# Patient Record
Sex: FEMALE | Race: BLACK OR AFRICAN AMERICAN | NOT HISPANIC OR LATINO | Employment: STUDENT | URBAN - METROPOLITAN AREA
[De-identification: names, ages, dates, MRNs, and addresses within clinical notes are randomized per-mention and may not be internally consistent; named-entity substitution may affect disease eponyms.]

---

## 2019-01-08 LAB — PAP SMEAR: NORMAL

## 2021-02-01 ENCOUNTER — OFFICE VISIT (OUTPATIENT)
Dept: FAMILY MEDICINE | Facility: CLINIC | Age: 26
End: 2021-02-01
Payer: COMMERCIAL

## 2021-02-01 VITALS
RESPIRATION RATE: 18 BRPM | SYSTOLIC BLOOD PRESSURE: 154 MMHG | OXYGEN SATURATION: 96 % | BODY MASS INDEX: 32.38 KG/M2 | DIASTOLIC BLOOD PRESSURE: 90 MMHG | HEIGHT: 61 IN | HEART RATE: 95 BPM | TEMPERATURE: 97 F | WEIGHT: 171.5 LBS

## 2021-02-01 DIAGNOSIS — K59.00 CONSTIPATION, UNSPECIFIED CONSTIPATION TYPE: Primary | ICD-10-CM

## 2021-02-01 DIAGNOSIS — Z13.220 ENCOUNTER FOR LIPID SCREENING FOR CARDIOVASCULAR DISEASE: ICD-10-CM

## 2021-02-01 DIAGNOSIS — R03.0 ELEVATED BLOOD PRESSURE READING: ICD-10-CM

## 2021-02-01 DIAGNOSIS — Z11.59 NEED FOR HEPATITIS C SCREENING TEST: ICD-10-CM

## 2021-02-01 DIAGNOSIS — Z11.4 ENCOUNTER FOR SCREENING FOR HIV: ICD-10-CM

## 2021-02-01 DIAGNOSIS — Z13.6 ENCOUNTER FOR LIPID SCREENING FOR CARDIOVASCULAR DISEASE: ICD-10-CM

## 2021-02-01 PROCEDURE — 99999 PR PBB SHADOW E&M-NEW PATIENT-LVL IV: CPT | Mod: PBBFAC,,, | Performed by: FAMILY MEDICINE

## 2021-02-01 PROCEDURE — 99204 PR OFFICE/OUTPT VISIT, NEW, LEVL IV, 45-59 MIN: ICD-10-PCS | Mod: S$GLB,,, | Performed by: FAMILY MEDICINE

## 2021-02-01 PROCEDURE — 99999 PR PBB SHADOW E&M-NEW PATIENT-LVL IV: ICD-10-PCS | Mod: PBBFAC,,, | Performed by: FAMILY MEDICINE

## 2021-02-01 PROCEDURE — 99204 OFFICE O/P NEW MOD 45 MIN: CPT | Mod: S$GLB,,, | Performed by: FAMILY MEDICINE

## 2021-02-15 ENCOUNTER — PATIENT OUTREACH (OUTPATIENT)
Dept: ADMINISTRATIVE | Facility: HOSPITAL | Age: 26
End: 2021-02-15

## 2021-03-01 ENCOUNTER — OFFICE VISIT (OUTPATIENT)
Dept: FAMILY MEDICINE | Facility: CLINIC | Age: 26
End: 2021-03-01
Payer: COMMERCIAL

## 2021-03-01 VITALS
RESPIRATION RATE: 18 BRPM | TEMPERATURE: 98 F | HEIGHT: 61 IN | BODY MASS INDEX: 34.21 KG/M2 | SYSTOLIC BLOOD PRESSURE: 142 MMHG | HEART RATE: 90 BPM | WEIGHT: 181.19 LBS | DIASTOLIC BLOOD PRESSURE: 93 MMHG | OXYGEN SATURATION: 99 %

## 2021-03-01 DIAGNOSIS — K59.00 CONSTIPATION, UNSPECIFIED CONSTIPATION TYPE: ICD-10-CM

## 2021-03-01 DIAGNOSIS — E66.09 CLASS 1 OBESITY DUE TO EXCESS CALORIES WITH SERIOUS COMORBIDITY AND BODY MASS INDEX (BMI) OF 34.0 TO 34.9 IN ADULT: ICD-10-CM

## 2021-03-01 DIAGNOSIS — I10 HYPERTENSION, BENIGN: Primary | ICD-10-CM

## 2021-03-01 PROCEDURE — 99999 PR PBB SHADOW E&M-EST. PATIENT-LVL IV: CPT | Mod: PBBFAC,,, | Performed by: FAMILY MEDICINE

## 2021-03-01 PROCEDURE — 99214 PR OFFICE/OUTPT VISIT, EST, LEVL IV, 30-39 MIN: ICD-10-PCS | Mod: S$GLB,,, | Performed by: FAMILY MEDICINE

## 2021-03-01 PROCEDURE — 99214 OFFICE O/P EST MOD 30 MIN: CPT | Mod: S$GLB,,, | Performed by: FAMILY MEDICINE

## 2021-03-01 PROCEDURE — 99999 PR PBB SHADOW E&M-EST. PATIENT-LVL IV: ICD-10-PCS | Mod: PBBFAC,,, | Performed by: FAMILY MEDICINE

## 2021-03-01 RX ORDER — NORELGESTROMIN AND ETHINYL ESTRADIOL 150; 35 UG/D; UG/D
PATCH TRANSDERMAL
COMMUNITY
Start: 2021-02-25 | End: 2023-04-27

## 2021-03-01 RX ORDER — COVID-19 MOLECULAR TEST ASSAY
KIT MISCELLANEOUS
COMMUNITY
Start: 2021-02-26 | End: 2022-04-11

## 2021-03-01 RX ORDER — AMLODIPINE BESYLATE 5 MG/1
5 TABLET ORAL DAILY
Qty: 90 TABLET | Refills: 3 | Status: SHIPPED | OUTPATIENT
Start: 2021-03-01 | End: 2021-09-23

## 2021-03-03 DIAGNOSIS — K59.00 CONSTIPATION, UNSPECIFIED CONSTIPATION TYPE: ICD-10-CM

## 2021-03-09 ENCOUNTER — PATIENT MESSAGE (OUTPATIENT)
Dept: FAMILY MEDICINE | Facility: CLINIC | Age: 26
End: 2021-03-09

## 2021-03-15 ENCOUNTER — CLINICAL SUPPORT (OUTPATIENT)
Dept: FAMILY MEDICINE | Facility: CLINIC | Age: 26
End: 2021-03-15
Payer: COMMERCIAL

## 2021-03-15 VITALS
RESPIRATION RATE: 18 BRPM | HEIGHT: 61 IN | SYSTOLIC BLOOD PRESSURE: 132 MMHG | DIASTOLIC BLOOD PRESSURE: 86 MMHG | BODY MASS INDEX: 34.24 KG/M2 | TEMPERATURE: 98 F | HEART RATE: 94 BPM

## 2021-03-15 DIAGNOSIS — I10 HYPERTENSION, BENIGN: Primary | ICD-10-CM

## 2021-03-15 PROCEDURE — 99999 PR PBB SHADOW E&M-EST. PATIENT-LVL III: ICD-10-PCS | Mod: PBBFAC,,,

## 2021-03-15 PROCEDURE — 99499 NO LOS: ICD-10-PCS | Mod: S$GLB,,, | Performed by: FAMILY MEDICINE

## 2021-03-15 PROCEDURE — 99999 PR PBB SHADOW E&M-EST. PATIENT-LVL III: CPT | Mod: PBBFAC,,,

## 2021-03-15 PROCEDURE — 99499 UNLISTED E&M SERVICE: CPT | Mod: S$GLB,,, | Performed by: FAMILY MEDICINE

## 2021-04-15 ENCOUNTER — PATIENT MESSAGE (OUTPATIENT)
Dept: RESEARCH | Facility: HOSPITAL | Age: 26
End: 2021-04-15

## 2021-09-21 ENCOUNTER — LAB VISIT (OUTPATIENT)
Dept: LAB | Facility: HOSPITAL | Age: 26
End: 2021-09-21
Attending: FAMILY MEDICINE
Payer: COMMERCIAL

## 2021-09-21 DIAGNOSIS — I10 HYPERTENSION, BENIGN: ICD-10-CM

## 2021-09-21 PROCEDURE — 82570 ASSAY OF URINE CREATININE: CPT | Performed by: FAMILY MEDICINE

## 2021-09-22 LAB
ALBUMIN/CREAT UR: 11.8 UG/MG (ref 0–30)
CREAT UR-MCNC: 187 MG/DL (ref 15–325)
MICROALBUMIN UR DL<=1MG/L-MCNC: 22 UG/ML

## 2021-09-23 ENCOUNTER — OFFICE VISIT (OUTPATIENT)
Dept: FAMILY MEDICINE | Facility: CLINIC | Age: 26
End: 2021-09-23
Payer: COMMERCIAL

## 2021-09-23 VITALS
SYSTOLIC BLOOD PRESSURE: 156 MMHG | TEMPERATURE: 98 F | BODY MASS INDEX: 32.13 KG/M2 | OXYGEN SATURATION: 99 % | RESPIRATION RATE: 18 BRPM | HEART RATE: 99 BPM | HEIGHT: 61 IN | DIASTOLIC BLOOD PRESSURE: 80 MMHG | WEIGHT: 170.19 LBS

## 2021-09-23 DIAGNOSIS — E66.09 CLASS 1 OBESITY DUE TO EXCESS CALORIES WITH SERIOUS COMORBIDITY AND BODY MASS INDEX (BMI) OF 32.0 TO 32.9 IN ADULT: ICD-10-CM

## 2021-09-23 DIAGNOSIS — I10 HYPERTENSION, BENIGN: Primary | ICD-10-CM

## 2021-09-23 PROCEDURE — 99999 PR PBB SHADOW E&M-EST. PATIENT-LVL III: CPT | Mod: PBBFAC,,, | Performed by: FAMILY MEDICINE

## 2021-09-23 PROCEDURE — 99214 PR OFFICE/OUTPT VISIT, EST, LEVL IV, 30-39 MIN: ICD-10-PCS | Mod: S$GLB,,, | Performed by: FAMILY MEDICINE

## 2021-09-23 PROCEDURE — 99999 PR PBB SHADOW E&M-EST. PATIENT-LVL III: ICD-10-PCS | Mod: PBBFAC,,, | Performed by: FAMILY MEDICINE

## 2021-09-23 PROCEDURE — 99214 OFFICE O/P EST MOD 30 MIN: CPT | Mod: S$GLB,,, | Performed by: FAMILY MEDICINE

## 2021-09-23 RX ORDER — AMLODIPINE BESYLATE 10 MG/1
10 TABLET ORAL DAILY
Qty: 90 TABLET | Refills: 1 | Status: SHIPPED | OUTPATIENT
Start: 2021-09-23 | End: 2022-03-22

## 2021-09-27 ENCOUNTER — TELEPHONE (OUTPATIENT)
Dept: FAMILY MEDICINE | Facility: CLINIC | Age: 26
End: 2021-09-27

## 2021-10-07 ENCOUNTER — CLINICAL SUPPORT (OUTPATIENT)
Dept: FAMILY MEDICINE | Facility: CLINIC | Age: 26
End: 2021-10-07
Payer: COMMERCIAL

## 2021-10-07 VITALS
OXYGEN SATURATION: 99 % | RESPIRATION RATE: 18 BRPM | DIASTOLIC BLOOD PRESSURE: 84 MMHG | HEART RATE: 88 BPM | SYSTOLIC BLOOD PRESSURE: 130 MMHG

## 2021-10-07 DIAGNOSIS — R03.0 ELEVATED BLOOD PRESSURE READING: Primary | ICD-10-CM

## 2021-10-07 PROCEDURE — 99999 PR PBB SHADOW E&M-EST. PATIENT-LVL II: CPT | Mod: PBBFAC,,,

## 2021-10-07 PROCEDURE — 99499 UNLISTED E&M SERVICE: CPT | Mod: S$GLB,,, | Performed by: FAMILY MEDICINE

## 2021-10-07 PROCEDURE — 99499 NO LOS: ICD-10-PCS | Mod: S$GLB,,, | Performed by: FAMILY MEDICINE

## 2021-10-07 PROCEDURE — 99999 PR PBB SHADOW E&M-EST. PATIENT-LVL II: ICD-10-PCS | Mod: PBBFAC,,,

## 2021-10-09 DIAGNOSIS — I10 HYPERTENSION, BENIGN: Primary | ICD-10-CM

## 2021-10-09 RX ORDER — ACETAMINOPHEN 500 MG
TABLET ORAL
Qty: 1 EACH | Refills: 0 | Status: SHIPPED | OUTPATIENT
Start: 2021-10-09 | End: 2022-04-11 | Stop reason: SDUPTHER

## 2022-03-24 LAB — PAP RECOMMENDATION EXT: NORMAL

## 2022-04-04 ENCOUNTER — LAB VISIT (OUTPATIENT)
Dept: LAB | Facility: HOSPITAL | Age: 27
End: 2022-04-04
Attending: FAMILY MEDICINE
Payer: COMMERCIAL

## 2022-04-04 DIAGNOSIS — I10 HYPERTENSION, BENIGN: ICD-10-CM

## 2022-04-04 LAB
ALBUMIN SERPL BCP-MCNC: 3.9 G/DL (ref 3.5–5.2)
ALP SERPL-CCNC: 99 U/L (ref 55–135)
ALT SERPL W/O P-5'-P-CCNC: 16 U/L (ref 10–44)
ANION GAP SERPL CALC-SCNC: 4 MMOL/L (ref 8–16)
AST SERPL-CCNC: 24 U/L (ref 10–40)
BILIRUB SERPL-MCNC: 0.2 MG/DL (ref 0.1–1)
BUN SERPL-MCNC: 7 MG/DL (ref 6–20)
CALCIUM SERPL-MCNC: 9.8 MG/DL (ref 8.7–10.5)
CHLORIDE SERPL-SCNC: 106 MMOL/L (ref 95–110)
CHOLEST SERPL-MCNC: 272 MG/DL (ref 120–199)
CHOLEST/HDLC SERPL: 3.2 {RATIO} (ref 2–5)
CO2 SERPL-SCNC: 27 MMOL/L (ref 23–29)
CREAT SERPL-MCNC: 0.9 MG/DL (ref 0.5–1.4)
EST. GFR  (AFRICAN AMERICAN): >60 ML/MIN/1.73 M^2
EST. GFR  (NON AFRICAN AMERICAN): >60 ML/MIN/1.73 M^2
GLUCOSE SERPL-MCNC: 90 MG/DL (ref 70–110)
HDLC SERPL-MCNC: 85 MG/DL (ref 40–75)
HDLC SERPL: 31.3 % (ref 20–50)
LDLC SERPL CALC-MCNC: 168.8 MG/DL (ref 63–159)
NONHDLC SERPL-MCNC: 187 MG/DL
POTASSIUM SERPL-SCNC: 4.3 MMOL/L (ref 3.5–5.1)
PROT SERPL-MCNC: 7.7 G/DL (ref 6–8.4)
SODIUM SERPL-SCNC: 137 MMOL/L (ref 136–145)
TRIGL SERPL-MCNC: 91 MG/DL (ref 30–150)

## 2022-04-04 PROCEDURE — 36415 COLL VENOUS BLD VENIPUNCTURE: CPT | Mod: PN | Performed by: FAMILY MEDICINE

## 2022-04-04 PROCEDURE — 80053 COMPREHEN METABOLIC PANEL: CPT | Performed by: FAMILY MEDICINE

## 2022-04-04 PROCEDURE — 80061 LIPID PANEL: CPT | Performed by: FAMILY MEDICINE

## 2022-04-11 ENCOUNTER — OFFICE VISIT (OUTPATIENT)
Dept: FAMILY MEDICINE | Facility: CLINIC | Age: 27
End: 2022-04-11
Payer: COMMERCIAL

## 2022-04-11 VITALS
WEIGHT: 164 LBS | TEMPERATURE: 98 F | BODY MASS INDEX: 30.96 KG/M2 | HEART RATE: 91 BPM | OXYGEN SATURATION: 99 % | SYSTOLIC BLOOD PRESSURE: 128 MMHG | RESPIRATION RATE: 17 BRPM | HEIGHT: 61 IN | DIASTOLIC BLOOD PRESSURE: 70 MMHG

## 2022-04-11 DIAGNOSIS — E78.00 PURE HYPERCHOLESTEROLEMIA: ICD-10-CM

## 2022-04-11 DIAGNOSIS — I10 HYPERTENSION, BENIGN: Primary | ICD-10-CM

## 2022-04-11 DIAGNOSIS — G44.209 TENSION HEADACHE: ICD-10-CM

## 2022-04-11 PROCEDURE — 99214 PR OFFICE/OUTPT VISIT, EST, LEVL IV, 30-39 MIN: ICD-10-PCS | Mod: S$GLB,,, | Performed by: FAMILY MEDICINE

## 2022-04-11 PROCEDURE — 99999 PR PBB SHADOW E&M-EST. PATIENT-LVL IV: CPT | Mod: PBBFAC,,, | Performed by: FAMILY MEDICINE

## 2022-04-11 PROCEDURE — 99999 PR PBB SHADOW E&M-EST. PATIENT-LVL IV: ICD-10-PCS | Mod: PBBFAC,,, | Performed by: FAMILY MEDICINE

## 2022-04-11 PROCEDURE — 99214 OFFICE O/P EST MOD 30 MIN: CPT | Mod: S$GLB,,, | Performed by: FAMILY MEDICINE

## 2022-04-11 RX ORDER — BUTALBITAL, ACETAMINOPHEN AND CAFFEINE 50; 325; 40 MG/1; MG/1; MG/1
2 TABLET ORAL EVERY 8 HOURS PRN
Qty: 30 TABLET | Refills: 1 | Status: SHIPPED | OUTPATIENT
Start: 2022-04-11 | End: 2023-04-27 | Stop reason: ALTCHOICE

## 2022-04-11 RX ORDER — AMLODIPINE BESYLATE 10 MG/1
10 TABLET ORAL DAILY
Qty: 90 TABLET | Refills: 1 | Status: SHIPPED | OUTPATIENT
Start: 2022-04-11 | End: 2022-05-24

## 2022-04-11 NOTE — PROGRESS NOTES
Subjective:       Patient ID: Bailey Arita is a 26 y.o. female.    Chief Complaint: Results and Headache    Headache   This is a new problem. Episode onset: a few months. Episode frequency: 3-4 per month. The pain is located in the frontal and parietal region. Radiates to: towards neck. Quality: pressure. The pain is at a severity of 7/10. Pertinent negatives include no abdominal pain, ear pain, eye pain, eye watering, phonophobia, photophobia, scalp tenderness, seizures, vomiting or weight loss. Exacerbated by: random. Treatments tried: Excedrin.     Patient reports she is taking her amlodipine for hypertension as prescribed. No medication side effects reported      Review of Systems   Constitutional: Negative for weight loss.   HENT: Negative for ear pain.    Eyes: Negative for photophobia and pain.   Gastrointestinal: Negative for abdominal pain and vomiting.   Neurological: Positive for headaches. Negative for seizures.         Objective:      Physical Exam  Vitals reviewed.   Constitutional:       Appearance: She is well-developed.   HENT:      Head: Normocephalic and atraumatic.      Right Ear: External ear normal.      Left Ear: External ear normal.      Nose: Nose normal.      Mouth/Throat:      Pharynx: No oropharyngeal exudate.   Eyes:      General:         Right eye: No discharge.         Left eye: No discharge.      Conjunctiva/sclera: Conjunctivae normal.      Pupils: Pupils are equal, round, and reactive to light.   Neck:      Trachea: No tracheal deviation.   Cardiovascular:      Rate and Rhythm: Normal rate and regular rhythm.      Heart sounds: Normal heart sounds. No murmur heard.  Pulmonary:      Effort: Pulmonary effort is normal.      Breath sounds: Normal breath sounds. No wheezing or rales.   Abdominal:      General: Bowel sounds are normal.      Palpations: Abdomen is soft. Abdomen is not rigid. There is no mass.      Tenderness: There is no abdominal tenderness. There is no guarding.    Musculoskeletal:      Cervical back: Normal range of motion and neck supple.   Lymphadenopathy:      Cervical: No cervical adenopathy.   Neurological:      Mental Status: She is oriented to person, place, and time.      Sensory: No sensory deficit.      Motor: No atrophy.      Gait: Gait normal.      Deep Tendon Reflexes:      Reflex Scores:       Patellar reflexes are 2+ on the right side and 2+ on the left side.        Assessment:       Problem List Items Addressed This Visit    None     Visit Diagnoses     Hypertension, benign    -  Primary    Relevant Medications    amLODIPine (NORVASC) 10 MG tablet    Other Relevant Orders    Comprehensive Metabolic Panel    Lipid Panel    Microalbumin/Creatinine Ratio, Urine    CBC Auto Differential    Pure hypercholesterolemia        Relevant Orders    Comprehensive Metabolic Panel    Lipid Panel    CBC Auto Differential          Plan:       Bailey was seen today for results and headache.    Diagnoses and all orders for this visit:    Hypertension, benign  -     amLODIPine (NORVASC) 10 MG tablet; Take 1 tablet (10 mg total) by mouth once daily.  -     Comprehensive Metabolic Panel; Future  -     Lipid Panel; Future  -     Microalbumin/Creatinine Ratio, Urine; Future  -     CBC Auto Differential; Future  Current therapy effective, will continue.      Pure hypercholesterolemia  -     Comprehensive Metabolic Panel; Future  -     Lipid Panel; Future  -     CBC Auto Differential; Future  Check lipid studies.    Tension headache  -     butalbital-acetaminophen-caffeine -40 mg (FIORICET, ESGIC) -40 mg per tablet; Take 2 tablets by mouth every 8 (eight) hours as needed for Headaches.  Short course of Fioricet prescribed.  Advised patient if headaches become more frequent will need neurology consult.

## 2022-05-24 DIAGNOSIS — I10 HYPERTENSION, BENIGN: ICD-10-CM

## 2022-05-24 RX ORDER — AMLODIPINE BESYLATE 10 MG/1
TABLET ORAL
Qty: 90 TABLET | Refills: 3 | Status: SHIPPED | OUTPATIENT
Start: 2022-05-24 | End: 2022-07-09

## 2022-05-24 NOTE — TELEPHONE ENCOUNTER
No new care gaps identified.  [facility] Embedded Care Gaps. Reference number: [id number]. 5/24/2022   8:07:09 AM CDT

## 2022-05-25 NOTE — TELEPHONE ENCOUNTER
Refill Authorization Note   Bailey Arita  is requesting a refill authorization.  Brief Assessment and Rationale for Refill:  Approve     Medication Therapy Plan:       Medication Reconciliation Completed: No   Comments:     No Care Gaps recommended.     Note composed:7:10 PM 05/24/2022

## 2022-07-09 RX ORDER — AMLODIPINE BESYLATE 10 MG/1
10 TABLET ORAL DAILY
COMMUNITY
End: 2022-10-13 | Stop reason: SDUPTHER

## 2022-07-10 NOTE — TELEPHONE ENCOUNTER
Amlodipine order discontinued due to cosign declined by Dr. Conteh. Entered patient-reported order on med list for placeholder.

## 2022-10-06 ENCOUNTER — LAB VISIT (OUTPATIENT)
Dept: LAB | Facility: HOSPITAL | Age: 27
End: 2022-10-06
Attending: FAMILY MEDICINE
Payer: MEDICAID

## 2022-10-06 DIAGNOSIS — I10 HYPERTENSION, BENIGN: ICD-10-CM

## 2022-10-06 DIAGNOSIS — E78.00 PURE HYPERCHOLESTEROLEMIA: ICD-10-CM

## 2022-10-06 LAB
ALBUMIN SERPL BCP-MCNC: 3.8 G/DL (ref 3.5–5.2)
ALP SERPL-CCNC: 82 U/L (ref 55–135)
ALT SERPL W/O P-5'-P-CCNC: 11 U/L (ref 10–44)
ANION GAP SERPL CALC-SCNC: 8 MMOL/L (ref 8–16)
AST SERPL-CCNC: 25 U/L (ref 10–40)
BASOPHILS # BLD AUTO: 0.03 K/UL (ref 0–0.2)
BASOPHILS NFR BLD: 0.5 % (ref 0–1.9)
BILIRUB SERPL-MCNC: 0.2 MG/DL (ref 0.1–1)
BUN SERPL-MCNC: 8 MG/DL (ref 6–20)
CALCIUM SERPL-MCNC: 9.6 MG/DL (ref 8.7–10.5)
CHLORIDE SERPL-SCNC: 104 MMOL/L (ref 95–110)
CHOLEST SERPL-MCNC: 276 MG/DL (ref 120–199)
CHOLEST/HDLC SERPL: 3.1 {RATIO} (ref 2–5)
CO2 SERPL-SCNC: 28 MMOL/L (ref 23–29)
CREAT SERPL-MCNC: 0.9 MG/DL (ref 0.5–1.4)
DIFFERENTIAL METHOD: ABNORMAL
EOSINOPHIL # BLD AUTO: 0.2 K/UL (ref 0–0.5)
EOSINOPHIL NFR BLD: 4.3 % (ref 0–8)
ERYTHROCYTE [DISTWIDTH] IN BLOOD BY AUTOMATED COUNT: 14.6 % (ref 11.5–14.5)
EST. GFR  (NO RACE VARIABLE): >60 ML/MIN/1.73 M^2
GLUCOSE SERPL-MCNC: 97 MG/DL (ref 70–110)
HCT VFR BLD AUTO: 34.1 % (ref 37–48.5)
HDLC SERPL-MCNC: 89 MG/DL (ref 40–75)
HDLC SERPL: 32.2 % (ref 20–50)
HGB BLD-MCNC: 10.6 G/DL (ref 12–16)
IMM GRANULOCYTES # BLD AUTO: 0.02 K/UL (ref 0–0.04)
IMM GRANULOCYTES NFR BLD AUTO: 0.4 % (ref 0–0.5)
LDLC SERPL CALC-MCNC: 163.2 MG/DL (ref 63–159)
LYMPHOCYTES # BLD AUTO: 1.6 K/UL (ref 1–4.8)
LYMPHOCYTES NFR BLD: 29.2 % (ref 18–48)
MCH RBC QN AUTO: 25.4 PG (ref 27–31)
MCHC RBC AUTO-ENTMCNC: 31.1 G/DL (ref 32–36)
MCV RBC AUTO: 82 FL (ref 82–98)
MONOCYTES # BLD AUTO: 0.6 K/UL (ref 0.3–1)
MONOCYTES NFR BLD: 10.6 % (ref 4–15)
NEUTROPHILS # BLD AUTO: 3.1 K/UL (ref 1.8–7.7)
NEUTROPHILS NFR BLD: 55 % (ref 38–73)
NONHDLC SERPL-MCNC: 187 MG/DL
NRBC BLD-RTO: 0 /100 WBC
PLATELET # BLD AUTO: 253 K/UL (ref 150–450)
PMV BLD AUTO: 10.4 FL (ref 9.2–12.9)
POTASSIUM SERPL-SCNC: 4.2 MMOL/L (ref 3.5–5.1)
PROT SERPL-MCNC: 7.5 G/DL (ref 6–8.4)
RBC # BLD AUTO: 4.17 M/UL (ref 4–5.4)
SODIUM SERPL-SCNC: 140 MMOL/L (ref 136–145)
TRIGL SERPL-MCNC: 119 MG/DL (ref 30–150)
WBC # BLD AUTO: 5.59 K/UL (ref 3.9–12.7)

## 2022-10-06 PROCEDURE — 80053 COMPREHEN METABOLIC PANEL: CPT | Performed by: FAMILY MEDICINE

## 2022-10-06 PROCEDURE — 36415 COLL VENOUS BLD VENIPUNCTURE: CPT | Mod: PN | Performed by: FAMILY MEDICINE

## 2022-10-06 PROCEDURE — 85025 COMPLETE CBC W/AUTO DIFF WBC: CPT | Performed by: FAMILY MEDICINE

## 2022-10-06 PROCEDURE — 80061 LIPID PANEL: CPT | Performed by: FAMILY MEDICINE

## 2022-10-13 ENCOUNTER — OFFICE VISIT (OUTPATIENT)
Dept: FAMILY MEDICINE | Facility: CLINIC | Age: 27
End: 2022-10-13
Payer: MEDICAID

## 2022-10-13 VITALS — DIASTOLIC BLOOD PRESSURE: 78 MMHG | SYSTOLIC BLOOD PRESSURE: 121 MMHG

## 2022-10-13 DIAGNOSIS — R51.9 CHRONIC NONINTRACTABLE HEADACHE, UNSPECIFIED HEADACHE TYPE: ICD-10-CM

## 2022-10-13 DIAGNOSIS — D64.9 NORMOCYTIC ANEMIA: ICD-10-CM

## 2022-10-13 DIAGNOSIS — I10 HYPERTENSION, BENIGN: Primary | ICD-10-CM

## 2022-10-13 DIAGNOSIS — G89.29 CHRONIC NONINTRACTABLE HEADACHE, UNSPECIFIED HEADACHE TYPE: ICD-10-CM

## 2022-10-13 DIAGNOSIS — E78.00 PURE HYPERCHOLESTEROLEMIA: ICD-10-CM

## 2022-10-13 PROCEDURE — 3066F NEPHROPATHY DOC TX: CPT | Mod: CPTII,95,, | Performed by: FAMILY MEDICINE

## 2022-10-13 PROCEDURE — 1159F PR MEDICATION LIST DOCUMENTED IN MEDICAL RECORD: ICD-10-PCS | Mod: CPTII,95,, | Performed by: FAMILY MEDICINE

## 2022-10-13 PROCEDURE — 1160F PR REVIEW ALL MEDS BY PRESCRIBER/CLIN PHARMACIST DOCUMENTED: ICD-10-PCS | Mod: CPTII,95,, | Performed by: FAMILY MEDICINE

## 2022-10-13 PROCEDURE — 3078F PR MOST RECENT DIASTOLIC BLOOD PRESSURE < 80 MM HG: ICD-10-PCS | Mod: CPTII,95,, | Performed by: FAMILY MEDICINE

## 2022-10-13 PROCEDURE — 3061F NEG MICROALBUMINURIA REV: CPT | Mod: CPTII,95,, | Performed by: FAMILY MEDICINE

## 2022-10-13 PROCEDURE — 1160F RVW MEDS BY RX/DR IN RCRD: CPT | Mod: CPTII,95,, | Performed by: FAMILY MEDICINE

## 2022-10-13 PROCEDURE — 99214 OFFICE O/P EST MOD 30 MIN: CPT | Mod: 95,,, | Performed by: FAMILY MEDICINE

## 2022-10-13 PROCEDURE — 3061F PR NEG MICROALBUMINURIA RESULT DOCUMENTED/REVIEW: ICD-10-PCS | Mod: CPTII,95,, | Performed by: FAMILY MEDICINE

## 2022-10-13 PROCEDURE — 3074F PR MOST RECENT SYSTOLIC BLOOD PRESSURE < 130 MM HG: ICD-10-PCS | Mod: CPTII,95,, | Performed by: FAMILY MEDICINE

## 2022-10-13 PROCEDURE — 3074F SYST BP LT 130 MM HG: CPT | Mod: CPTII,95,, | Performed by: FAMILY MEDICINE

## 2022-10-13 PROCEDURE — 1159F MED LIST DOCD IN RCRD: CPT | Mod: CPTII,95,, | Performed by: FAMILY MEDICINE

## 2022-10-13 PROCEDURE — 3066F PR DOCUMENTATION OF TREATMENT FOR NEPHROPATHY: ICD-10-PCS | Mod: CPTII,95,, | Performed by: FAMILY MEDICINE

## 2022-10-13 PROCEDURE — 99214 PR OFFICE/OUTPT VISIT, EST, LEVL IV, 30-39 MIN: ICD-10-PCS | Mod: 95,,, | Performed by: FAMILY MEDICINE

## 2022-10-13 PROCEDURE — 3078F DIAST BP <80 MM HG: CPT | Mod: CPTII,95,, | Performed by: FAMILY MEDICINE

## 2022-10-13 RX ORDER — AMLODIPINE BESYLATE 10 MG/1
10 TABLET ORAL DAILY
Qty: 90 TABLET | Refills: 3 | Status: SHIPPED | OUTPATIENT
Start: 2022-10-13 | End: 2023-11-02 | Stop reason: SDUPTHER

## 2022-10-14 ENCOUNTER — PATIENT OUTREACH (OUTPATIENT)
Dept: ADMINISTRATIVE | Facility: HOSPITAL | Age: 27
End: 2022-10-14
Payer: MEDICAID

## 2022-10-17 NOTE — PROGRESS NOTES
The patient location is: [place], [place]  The chief complaint leading to consultation is: HTN and lab results    Visit type: audiovisual    Face to Face time with patient: 20 minutes  24 minutes of total time spent on the encounter, which includes face to face time and non-face to face time preparing to see the patient (eg, review of tests), Obtaining and/or reviewing separately obtained history, Documenting clinical information in the electronic or other health record, Independently interpreting results (not separately reported) and communicating results to the patient/family/caregiver, or Care coordination (not separately reported).         Each patient to whom he or she provides medical services by telemedicine is:  (1) informed of the relationship between the physician and patient and the respective role of any other health care provider with respect to management of the patient; and (2) notified that he or she may decline to receive medical services by telemedicine and may withdraw from such care at any time.    Notes:   Subjective:       Patient ID: Bailey Arita is a 26 y.o. female.    Chief Complaint: Hypertension and Results    Hypertension  This is a chronic problem. The problem is controlled. Associated symptoms include headaches (chronic). Pertinent negatives include no chest pain, neck pain or palpitations. Past treatments include calcium channel blockers. The current treatment provides significant improvement. There are no compliance problems.    Review of Systems   Constitutional:  Negative for activity change and unexpected weight change.   HENT:  Negative for hearing loss, rhinorrhea and trouble swallowing.    Eyes:  Negative for discharge and visual disturbance.   Respiratory:  Negative for chest tightness and wheezing.    Cardiovascular:  Negative for chest pain and palpitations.   Gastrointestinal:  Positive for constipation. Negative for blood in stool, diarrhea and vomiting.   Endocrine:  Negative for polydipsia and polyuria.   Genitourinary:  Positive for menstrual problem. Negative for difficulty urinating, dysuria and hematuria.   Musculoskeletal:  Negative for arthralgias, joint swelling and neck pain.   Neurological:  Positive for headaches (chronic). Negative for weakness.   Psychiatric/Behavioral:  Negative for confusion and dysphoric mood.        Objective:      Physical Exam  Pulmonary:      Effort: Pulmonary effort is normal.   Neurological:      Mental Status: She is alert.   Psychiatric:         Mood and Affect: Mood normal.         Behavior: Behavior normal.         Thought Content: Thought content normal.         Lab Visit on 10/06/2022   Component Date Value Ref Range Status    Microalbumin, Urine 10/06/2022 16.0  ug/mL Final    Creatinine, Urine 10/06/2022 93.0  15.0 - 325.0 mg/dL Final    Microalb/Creat Ratio 10/06/2022 17.2  0.0 - 30.0 ug/mg Final   Lab Visit on 10/06/2022   Component Date Value Ref Range Status    Sodium 10/06/2022 140  136 - 145 mmol/L Final    Potassium 10/06/2022 4.2  3.5 - 5.1 mmol/L Final    Chloride 10/06/2022 104  95 - 110 mmol/L Final    CO2 10/06/2022 28  23 - 29 mmol/L Final    Glucose 10/06/2022 97  70 - 110 mg/dL Final    BUN 10/06/2022 8  6 - 20 mg/dL Final    Creatinine 10/06/2022 0.9  0.5 - 1.4 mg/dL Final    Calcium 10/06/2022 9.6  8.7 - 10.5 mg/dL Final    Total Protein 10/06/2022 7.5  6.0 - 8.4 g/dL Final    Albumin 10/06/2022 3.8  3.5 - 5.2 g/dL Final    Total Bilirubin 10/06/2022 0.2  0.1 - 1.0 mg/dL Final    Comment: For infants and newborns, interpretation of results should be based  on gestational age, weight and in agreement with clinical  observations.    Premature Infant recommended reference ranges:  Up to 24 hours.............<8.0 mg/dL  Up to 48 hours............<12.0 mg/dL  3-5 days..................<15.0 mg/dL  6-29 days.................<15.0 mg/dL      Alkaline Phosphatase 10/06/2022 82  55 - 135 U/L Final    AST 10/06/2022 25  10 -  40 U/L Final    ALT 10/06/2022 11  10 - 44 U/L Final    Anion Gap 10/06/2022 8  8 - 16 mmol/L Final    eGFR 10/06/2022 >60  >60 mL/min/1.73 m^2 Final    Cholesterol 10/06/2022 276 (H)  120 - 199 mg/dL Final    Comment: The National Cholesterol Education Program (NCEP) has set the  following guidelines (reference ranges) for Cholesterol:  Optimal.....................<200 mg/dL  Borderline High.............200-239 mg/dL  High........................> or = 240 mg/dL      Triglycerides 10/06/2022 119  30 - 150 mg/dL Final    Comment: The National Cholesterol Education Program (NCEP) has set the  following guidelines (reference values) for triglycerides:  Normal......................<150 mg/dL  Borderline High.............150-199 mg/dL  High........................200-499 mg/dL      HDL 10/06/2022 89 (H)  40 - 75 mg/dL Final    Comment: The National Cholesterol Education Program (NCEP) has set the  following guidelines (reference values) for HDL Cholesterol:  Low...............<40 mg/dL  Optimal...........>60 mg/dL      LDL Cholesterol 10/06/2022 163.2 (H)  63.0 - 159.0 mg/dL Final    Comment: The National Cholesterol Education Program (NCEP) has set the  following guidelines (reference values) for LDL Cholesterol:  Optimal.......................<130 mg/dL  Borderline High...............130-159 mg/dL  High..........................160-189 mg/dL  Very High.....................>190 mg/dL      HDL/Cholesterol Ratio 10/06/2022 32.2  20.0 - 50.0 % Final    Total Cholesterol/HDL Ratio 10/06/2022 3.1  2.0 - 5.0 Final    Non-HDL Cholesterol 10/06/2022 187  mg/dL Final    Comment: Risk category and Non-HDL cholesterol goals:  Coronary heart disease (CHD)or equivalent (10-year risk of CHD >20%):  Non-HDL cholesterol goal     <130 mg/dL  Two or more CHD risk factors and 10-year risk of CHD <= 20%:  Non-HDL cholesterol goal     <160 mg/dL  0 to 1 CHD risk factor:  Non-HDL cholesterol goal     <190 mg/dL      WBC 10/06/2022 5.59   3.90 - 12.70 K/uL Final    RBC 10/06/2022 4.17  4.00 - 5.40 M/uL Final    Hemoglobin 10/06/2022 10.6 (L)  12.0 - 16.0 g/dL Final    Hematocrit 10/06/2022 34.1 (L)  37.0 - 48.5 % Final    MCV 10/06/2022 82  82 - 98 fL Final    MCH 10/06/2022 25.4 (L)  27.0 - 31.0 pg Final    MCHC 10/06/2022 31.1 (L)  32.0 - 36.0 g/dL Final    RDW 10/06/2022 14.6 (H)  11.5 - 14.5 % Final    Platelets 10/06/2022 253  150 - 450 K/uL Final    MPV 10/06/2022 10.4  9.2 - 12.9 fL Final    Immature Granulocytes 10/06/2022 0.4  0.0 - 0.5 % Final    Gran # (ANC) 10/06/2022 3.1  1.8 - 7.7 K/uL Final    Immature Grans (Abs) 10/06/2022 0.02  0.00 - 0.04 K/uL Final    Comment: Mild elevation in immature granulocytes is non specific and   can be seen in a variety of conditions including stress response,   acute inflammation, trauma and pregnancy. Correlation with other   laboratory and clinical findings is essential.      Lymph # 10/06/2022 1.6  1.0 - 4.8 K/uL Final    Mono # 10/06/2022 0.6  0.3 - 1.0 K/uL Final    Eos # 10/06/2022 0.2  0.0 - 0.5 K/uL Final    Baso # 10/06/2022 0.03  0.00 - 0.20 K/uL Final    nRBC 10/06/2022 0  0 /100 WBC Final    Gran % 10/06/2022 55.0  38.0 - 73.0 % Final    Lymph % 10/06/2022 29.2  18.0 - 48.0 % Final    Mono % 10/06/2022 10.6  4.0 - 15.0 % Final    Eosinophil % 10/06/2022 4.3  0.0 - 8.0 % Final    Basophil % 10/06/2022 0.5  0.0 - 1.9 % Final    Differential Method 10/06/2022 Automated   Final       Assessment:       Problem List Items Addressed This Visit    None  Visit Diagnoses       Hypertension, benign    -  Primary    Relevant Medications    amLODIPine (NORVASC) 10 MG tablet    Chronic nonintractable headache, unspecified headache type        Relevant Orders    Ambulatory referral/consult to Neurology    Normocytic anemia        Relevant Orders    CBC Auto Differential    Iron and TIBC    Ferritin    RETICULOCYTES    Pure hypercholesterolemia        Relevant Orders    Lipid Panel    Comprehensive Metabolic  Panel              Plan:      Bailey was seen today for hypertension and results.    Diagnoses and all orders for this visit:    Hypertension, benign  -     amLODIPine (NORVASC) 10 MG tablet; Take 1 tablet (10 mg total) by mouth once daily.  Current therapy effective, will continue    Chronic nonintractable headache, unspecified headache type  -     Ambulatory referral/consult to Neurology; Future  Will get neuro eval    Normocytic anemia  -     CBC Auto Differential; Future  -     Iron and TIBC; Future  -     Ferritin; Future  -     RETICULOCYTES; Future  Recheck in 6 weeks    Pure hypercholesterolemia  -     Lipid Panel; Future  -     Comprehensive Metabolic Panel; Future  Dietary and lifestyle changes discussed  Recheck in 6 months

## 2022-11-10 ENCOUNTER — LAB VISIT (OUTPATIENT)
Dept: LAB | Facility: HOSPITAL | Age: 27
End: 2022-11-10
Attending: FAMILY MEDICINE
Payer: MEDICAID

## 2022-11-10 DIAGNOSIS — D64.9 NORMOCYTIC ANEMIA: ICD-10-CM

## 2022-11-10 LAB
BASOPHILS # BLD AUTO: 0.03 K/UL (ref 0–0.2)
BASOPHILS NFR BLD: 0.5 % (ref 0–1.9)
DIFFERENTIAL METHOD: ABNORMAL
EOSINOPHIL # BLD AUTO: 0.2 K/UL (ref 0–0.5)
EOSINOPHIL NFR BLD: 3.5 % (ref 0–8)
ERYTHROCYTE [DISTWIDTH] IN BLOOD BY AUTOMATED COUNT: 14.7 % (ref 11.5–14.5)
FERRITIN SERPL-MCNC: 5 NG/ML (ref 20–300)
HCT VFR BLD AUTO: 33.9 % (ref 37–48.5)
HGB BLD-MCNC: 10.7 G/DL (ref 12–16)
IMM GRANULOCYTES # BLD AUTO: 0.01 K/UL (ref 0–0.04)
IMM GRANULOCYTES NFR BLD AUTO: 0.2 % (ref 0–0.5)
IRON SERPL-MCNC: 41 UG/DL (ref 30–160)
LYMPHOCYTES # BLD AUTO: 1.7 K/UL (ref 1–4.8)
LYMPHOCYTES NFR BLD: 28 % (ref 18–48)
MCH RBC QN AUTO: 26 PG (ref 27–31)
MCHC RBC AUTO-ENTMCNC: 31.6 G/DL (ref 32–36)
MCV RBC AUTO: 83 FL (ref 82–98)
MONOCYTES # BLD AUTO: 0.5 K/UL (ref 0.3–1)
MONOCYTES NFR BLD: 8.8 % (ref 4–15)
NEUTROPHILS # BLD AUTO: 3.5 K/UL (ref 1.8–7.7)
NEUTROPHILS NFR BLD: 59 % (ref 38–73)
NRBC BLD-RTO: 0 /100 WBC
PLATELET # BLD AUTO: 246 K/UL (ref 150–450)
PMV BLD AUTO: 10.7 FL (ref 9.2–12.9)
RBC # BLD AUTO: 4.11 M/UL (ref 4–5.4)
RETICS/RBC NFR AUTO: 1.3 % (ref 0.5–2.5)
SATURATED IRON: 7 % (ref 20–50)
TOTAL IRON BINDING CAPACITY: 548 UG/DL (ref 250–450)
TRANSFERRIN SERPL-MCNC: 370 MG/DL (ref 200–375)
WBC # BLD AUTO: 5.99 K/UL (ref 3.9–12.7)

## 2022-11-10 PROCEDURE — 36415 COLL VENOUS BLD VENIPUNCTURE: CPT | Mod: PN | Performed by: FAMILY MEDICINE

## 2022-11-10 PROCEDURE — 84466 ASSAY OF TRANSFERRIN: CPT | Performed by: FAMILY MEDICINE

## 2022-11-10 PROCEDURE — 82728 ASSAY OF FERRITIN: CPT | Performed by: FAMILY MEDICINE

## 2022-11-10 PROCEDURE — 85025 COMPLETE CBC W/AUTO DIFF WBC: CPT | Performed by: FAMILY MEDICINE

## 2022-11-10 PROCEDURE — 85045 AUTOMATED RETICULOCYTE COUNT: CPT | Performed by: FAMILY MEDICINE

## 2022-12-04 DIAGNOSIS — D64.9 NORMOCYTIC ANEMIA: Primary | ICD-10-CM

## 2022-12-04 DIAGNOSIS — D50.9 IRON DEFICIENCY ANEMIA, UNSPECIFIED IRON DEFICIENCY ANEMIA TYPE: ICD-10-CM

## 2022-12-06 ENCOUNTER — PATIENT MESSAGE (OUTPATIENT)
Dept: FAMILY MEDICINE | Facility: CLINIC | Age: 27
End: 2022-12-06
Payer: MEDICAID

## 2022-12-09 DIAGNOSIS — D50.9 IRON DEFICIENCY ANEMIA, UNSPECIFIED: Primary | ICD-10-CM

## 2023-02-13 ENCOUNTER — LAB VISIT (OUTPATIENT)
Dept: LAB | Facility: HOSPITAL | Age: 28
End: 2023-02-13
Attending: STUDENT IN AN ORGANIZED HEALTH CARE EDUCATION/TRAINING PROGRAM
Payer: MEDICAID

## 2023-02-13 DIAGNOSIS — D50.9 IRON DEFICIENCY ANEMIA, UNSPECIFIED: ICD-10-CM

## 2023-02-13 LAB
ALBUMIN SERPL BCP-MCNC: 4 G/DL (ref 3.5–5.2)
ALP SERPL-CCNC: 92 U/L (ref 55–135)
ALT SERPL W/O P-5'-P-CCNC: 9 U/L (ref 10–44)
ANION GAP SERPL CALC-SCNC: 8 MMOL/L (ref 8–16)
AST SERPL-CCNC: 28 U/L (ref 10–40)
BASOPHILS # BLD AUTO: 0.05 K/UL (ref 0–0.2)
BASOPHILS NFR BLD: 0.9 % (ref 0–1.9)
BILIRUB SERPL-MCNC: 0.3 MG/DL (ref 0.1–1)
BUN SERPL-MCNC: 8 MG/DL (ref 6–20)
CALCIUM SERPL-MCNC: 9.7 MG/DL (ref 8.7–10.5)
CHLORIDE SERPL-SCNC: 105 MMOL/L (ref 95–110)
CO2 SERPL-SCNC: 27 MMOL/L (ref 23–29)
CREAT SERPL-MCNC: 0.9 MG/DL (ref 0.5–1.4)
DIFFERENTIAL METHOD: ABNORMAL
EOSINOPHIL # BLD AUTO: 0.2 K/UL (ref 0–0.5)
EOSINOPHIL NFR BLD: 3 % (ref 0–8)
ERYTHROCYTE [DISTWIDTH] IN BLOOD BY AUTOMATED COUNT: 13.3 % (ref 11.5–14.5)
EST. GFR  (NO RACE VARIABLE): >60 ML/MIN/1.73 M^2
FERRITIN SERPL-MCNC: 5 NG/ML (ref 20–300)
GLUCOSE SERPL-MCNC: 87 MG/DL (ref 70–110)
HCT VFR BLD AUTO: 34.9 % (ref 37–48.5)
HGB BLD-MCNC: 10.7 G/DL (ref 12–16)
IMM GRANULOCYTES # BLD AUTO: 0 K/UL (ref 0–0.04)
IMM GRANULOCYTES NFR BLD AUTO: 0 % (ref 0–0.5)
IRON SERPL-MCNC: 40 UG/DL (ref 30–160)
IRON SERPL-MCNC: 40 UG/DL (ref 30–160)
LYMPHOCYTES # BLD AUTO: 1.7 K/UL (ref 1–4.8)
LYMPHOCYTES NFR BLD: 32 % (ref 18–48)
MCH RBC QN AUTO: 25.3 PG (ref 27–31)
MCHC RBC AUTO-ENTMCNC: 30.7 G/DL (ref 32–36)
MCV RBC AUTO: 83 FL (ref 82–98)
MONOCYTES # BLD AUTO: 0.6 K/UL (ref 0.3–1)
MONOCYTES NFR BLD: 10.7 % (ref 4–15)
NEUTROPHILS # BLD AUTO: 2.8 K/UL (ref 1.8–7.7)
NEUTROPHILS NFR BLD: 53.4 % (ref 38–73)
NRBC BLD-RTO: 0 /100 WBC
PLATELET # BLD AUTO: 283 K/UL (ref 150–450)
PMV BLD AUTO: 9.7 FL (ref 9.2–12.9)
POTASSIUM SERPL-SCNC: 4.3 MMOL/L (ref 3.5–5.1)
PROT SERPL-MCNC: 8 G/DL (ref 6–8.4)
RBC # BLD AUTO: 4.23 M/UL (ref 4–5.4)
RETICS/RBC NFR AUTO: 1.4 % (ref 0.5–2.5)
SATURATED IRON: 7 % (ref 20–50)
SODIUM SERPL-SCNC: 140 MMOL/L (ref 136–145)
TOTAL IRON BINDING CAPACITY: 559 UG/DL (ref 250–450)
TRANSFERRIN SERPL-MCNC: 378 MG/DL (ref 200–375)
WBC # BLD AUTO: 5.32 K/UL (ref 3.9–12.7)

## 2023-02-13 PROCEDURE — 85045 AUTOMATED RETICULOCYTE COUNT: CPT | Performed by: STUDENT IN AN ORGANIZED HEALTH CARE EDUCATION/TRAINING PROGRAM

## 2023-02-13 PROCEDURE — 82728 ASSAY OF FERRITIN: CPT | Performed by: STUDENT IN AN ORGANIZED HEALTH CARE EDUCATION/TRAINING PROGRAM

## 2023-02-13 PROCEDURE — 80053 COMPREHEN METABOLIC PANEL: CPT | Performed by: STUDENT IN AN ORGANIZED HEALTH CARE EDUCATION/TRAINING PROGRAM

## 2023-02-13 PROCEDURE — 84466 ASSAY OF TRANSFERRIN: CPT | Performed by: STUDENT IN AN ORGANIZED HEALTH CARE EDUCATION/TRAINING PROGRAM

## 2023-02-13 PROCEDURE — 36415 COLL VENOUS BLD VENIPUNCTURE: CPT | Mod: PN | Performed by: STUDENT IN AN ORGANIZED HEALTH CARE EDUCATION/TRAINING PROGRAM

## 2023-02-13 PROCEDURE — 85025 COMPLETE CBC W/AUTO DIFF WBC: CPT | Performed by: STUDENT IN AN ORGANIZED HEALTH CARE EDUCATION/TRAINING PROGRAM

## 2023-02-17 ENCOUNTER — OFFICE VISIT (OUTPATIENT)
Dept: HEMATOLOGY/ONCOLOGY | Facility: CLINIC | Age: 28
End: 2023-02-17
Payer: MEDICAID

## 2023-02-17 VITALS
SYSTOLIC BLOOD PRESSURE: 144 MMHG | DIASTOLIC BLOOD PRESSURE: 94 MMHG | HEART RATE: 97 BPM | BODY MASS INDEX: 31.3 KG/M2 | HEIGHT: 61 IN | WEIGHT: 165.81 LBS | OXYGEN SATURATION: 100 % | TEMPERATURE: 98 F

## 2023-02-17 DIAGNOSIS — N94.6 DYSMENORRHEA: ICD-10-CM

## 2023-02-17 DIAGNOSIS — R51.9 CHRONIC NONINTRACTABLE HEADACHE, UNSPECIFIED HEADACHE TYPE: ICD-10-CM

## 2023-02-17 DIAGNOSIS — Z71.3 NUTRITIONAL COUNSELING: ICD-10-CM

## 2023-02-17 DIAGNOSIS — D50.0 IRON DEFICIENCY ANEMIA DUE TO CHRONIC BLOOD LOSS: ICD-10-CM

## 2023-02-17 DIAGNOSIS — G89.29 CHRONIC NONINTRACTABLE HEADACHE, UNSPECIFIED HEADACHE TYPE: ICD-10-CM

## 2023-02-17 DIAGNOSIS — E78.5 HYPERLIPIDEMIA, UNSPECIFIED HYPERLIPIDEMIA TYPE: ICD-10-CM

## 2023-02-17 DIAGNOSIS — I10 PRIMARY HYPERTENSION: ICD-10-CM

## 2023-02-17 DIAGNOSIS — D50.9 IRON DEFICIENCY ANEMIA, UNSPECIFIED IRON DEFICIENCY ANEMIA TYPE: ICD-10-CM

## 2023-02-17 DIAGNOSIS — D64.9 NORMOCYTIC ANEMIA: Primary | ICD-10-CM

## 2023-02-17 PROCEDURE — 3080F PR MOST RECENT DIASTOLIC BLOOD PRESSURE >= 90 MM HG: ICD-10-PCS | Mod: CPTII,,, | Performed by: STUDENT IN AN ORGANIZED HEALTH CARE EDUCATION/TRAINING PROGRAM

## 2023-02-17 PROCEDURE — 3077F SYST BP >= 140 MM HG: CPT | Mod: CPTII,,, | Performed by: STUDENT IN AN ORGANIZED HEALTH CARE EDUCATION/TRAINING PROGRAM

## 2023-02-17 PROCEDURE — 3008F BODY MASS INDEX DOCD: CPT | Mod: CPTII,,, | Performed by: STUDENT IN AN ORGANIZED HEALTH CARE EDUCATION/TRAINING PROGRAM

## 2023-02-17 PROCEDURE — 99999 PR PBB SHADOW E&M-EST. PATIENT-LVL IV: ICD-10-PCS | Mod: PBBFAC,,, | Performed by: STUDENT IN AN ORGANIZED HEALTH CARE EDUCATION/TRAINING PROGRAM

## 2023-02-17 PROCEDURE — 3077F PR MOST RECENT SYSTOLIC BLOOD PRESSURE >= 140 MM HG: ICD-10-PCS | Mod: CPTII,,, | Performed by: STUDENT IN AN ORGANIZED HEALTH CARE EDUCATION/TRAINING PROGRAM

## 2023-02-17 PROCEDURE — 1159F PR MEDICATION LIST DOCUMENTED IN MEDICAL RECORD: ICD-10-PCS | Mod: CPTII,,, | Performed by: STUDENT IN AN ORGANIZED HEALTH CARE EDUCATION/TRAINING PROGRAM

## 2023-02-17 PROCEDURE — 3080F DIAST BP >= 90 MM HG: CPT | Mod: CPTII,,, | Performed by: STUDENT IN AN ORGANIZED HEALTH CARE EDUCATION/TRAINING PROGRAM

## 2023-02-17 PROCEDURE — 99214 OFFICE O/P EST MOD 30 MIN: CPT | Mod: PBBFAC | Performed by: STUDENT IN AN ORGANIZED HEALTH CARE EDUCATION/TRAINING PROGRAM

## 2023-02-17 PROCEDURE — 99205 PR OFFICE/OUTPT VISIT, NEW, LEVL V, 60-74 MIN: ICD-10-PCS | Mod: S$PBB,,, | Performed by: STUDENT IN AN ORGANIZED HEALTH CARE EDUCATION/TRAINING PROGRAM

## 2023-02-17 PROCEDURE — 99205 OFFICE O/P NEW HI 60 MIN: CPT | Mod: S$PBB,,, | Performed by: STUDENT IN AN ORGANIZED HEALTH CARE EDUCATION/TRAINING PROGRAM

## 2023-02-17 PROCEDURE — 99999 PR PBB SHADOW E&M-EST. PATIENT-LVL IV: CPT | Mod: PBBFAC,,, | Performed by: STUDENT IN AN ORGANIZED HEALTH CARE EDUCATION/TRAINING PROGRAM

## 2023-02-17 PROCEDURE — 3008F PR BODY MASS INDEX (BMI) DOCUMENTED: ICD-10-PCS | Mod: CPTII,,, | Performed by: STUDENT IN AN ORGANIZED HEALTH CARE EDUCATION/TRAINING PROGRAM

## 2023-02-17 PROCEDURE — 1159F MED LIST DOCD IN RCRD: CPT | Mod: CPTII,,, | Performed by: STUDENT IN AN ORGANIZED HEALTH CARE EDUCATION/TRAINING PROGRAM

## 2023-02-17 NOTE — Clinical Note
-Please precert for feraheme and give iron before next MD visit in 3 months -Get labs (CBC, iron, ferritin, sol trans, Hb elec, copper, mma, path rev of CBC) week before

## 2023-02-17 NOTE — PROGRESS NOTES
Hematology- Oncology Clinic Note :     2023            HPI      Pt is a 28 yo  F with pmhx of headaches (chronic), HTN, HLD, dysmenorrhea who presents as a referral to establish care for anemia.  Labs significant for iron def and pt has been experiencing ice cravings and worsening fatigue for the past couple of months.  Cycles tend to be heavy and cramping despite being on OCPs for past 3-4 years.  Pt agreeable to try IV iron and will follow up with labs after.  Apart from fatigue no other issues at time of exam.            There are no problems to display for this patient.      There are no problems to display for this patient.    History reviewed. No pertinent past medical history.   Past Surgical History:   Procedure Laterality Date    APPENDECTOMY      BREAST SURGERY      KIDNEY SURGERY      Blocked tube      (Not in a hospital admission)    Review of patient's allergies indicates:  No Known Allergies   Social History     Tobacco Use    Smoking status: Never    Smokeless tobacco: Never   Substance Use Topics    Alcohol use: No      Family History   Problem Relation Age of Onset    Breast cancer Neg Hx     Colon cancer Neg Hx     Ovarian cancer Neg Hx         Review of Systems :  Review of Systems   Constitutional:  Positive for malaise/fatigue. Negative for fever.   HENT:  Negative for congestion, hearing loss and nosebleeds.    Eyes:  Negative for blurred vision and pain.   Respiratory:  Negative for cough and shortness of breath.    Cardiovascular:  Negative for chest pain, palpitations, claudication and leg swelling.   Gastrointestinal:  Negative for abdominal pain, blood in stool, constipation, diarrhea and heartburn.   Genitourinary:  Negative for dysuria and hematuria.   Musculoskeletal:  Negative for joint pain and myalgias.   Skin:  Negative for itching and rash.   Neurological:  Positive for headaches (chronic). Negative for dizziness and weakness.   Endo/Heme/Allergies:         Heavy periods    Psychiatric/Behavioral:  Negative for depression. The patient is not nervous/anxious.      Physical Exam :  Wt Readings from Last 3 Encounters:   02/17/23 75.2 kg (165 lb 12.6 oz)   04/11/22 74.4 kg (164 lb 0.4 oz)   09/23/21 77.2 kg (170 lb 3.1 oz)     Temp Readings from Last 3 Encounters:   02/17/23 98.2 °F (36.8 °C)   04/11/22 98.2 °F (36.8 °C) (Oral)   09/23/21 98 °F (36.7 °C) (Oral)     BP Readings from Last 3 Encounters:   02/17/23 (!) 144/94   10/13/22 121/78   04/11/22 128/70     Pulse Readings from Last 3 Encounters:   02/17/23 97   04/11/22 91   10/07/21 88     Body mass index is 31.32 kg/m².    Physical Exam  Vitals reviewed.   HENT:      Head: Normocephalic.      Nose: Nose normal.      Mouth/Throat:      Mouth: Mucous membranes are moist.   Eyes:      Pupils: Pupils are equal, round, and reactive to light.   Cardiovascular:      Heart sounds: Normal heart sounds.   Pulmonary:      Effort: Pulmonary effort is normal.      Breath sounds: Normal breath sounds.   Abdominal:      General: Abdomen is flat.   Musculoskeletal:         General: Normal range of motion.      Cervical back: Normal range of motion and neck supple.   Skin:     General: Skin is warm and dry.   Neurological:      Mental Status: She is alert and oriented to person, place, and time.   Psychiatric:         Mood and Affect: Mood normal.         Behavior: Behavior normal.         Thought Content: Thought content normal.         Judgment: Judgment normal.         Pertinent Diagnostic studies:    No results found for this or any previous visit (from the past 24 hour(s)).    Assessment/Plan :     Anemia, iron deficiency  -Labs significant for iron def anemia  -Most likely from heavy menses  -Pt denies blood in urine or stool  -Iron deficit of 1g calculated  -Will precert for IV iron  -nutrition referral placed  -RTC in 3 months with repeat labs week before      HTN  -Stable  -Per PCP      HLD  -Diet and exercise advised  -Per  PCP      Dysmenorrhea  -Pt complains of painful heavy cycles for past 3-4 years  -On OCPs      Hx of chronic headaches  -Stable  -Has follow up with neurology      Time spent on Case: 60 minutes      Summary of orders placed this encounter:  No orders of the defined types were placed in this encounter.      Future Appointments   Date Time Provider Department Center   4/20/2023  8:00 AM LAB, [facility] DRAW STATION [facility] LAB [facility]   4/27/2023  2:20 PM Rodger Conteh Jr., MD [facility]   5/5/2023 10:00 AM Lázaro Vela DO [facility] NEURO Perla Reni           Camelia Khalil MD   Hematology/oncology, [facility]

## 2023-03-07 ENCOUNTER — TELEPHONE (OUTPATIENT)
Dept: INFUSION THERAPY | Facility: HOSPITAL | Age: 28
End: 2023-03-07
Payer: MEDICAID

## 2023-03-14 ENCOUNTER — INFUSION (OUTPATIENT)
Dept: INFUSION THERAPY | Facility: HOSPITAL | Age: 28
End: 2023-03-14
Attending: STUDENT IN AN ORGANIZED HEALTH CARE EDUCATION/TRAINING PROGRAM
Payer: MEDICAID

## 2023-03-14 VITALS
OXYGEN SATURATION: 86 % | DIASTOLIC BLOOD PRESSURE: 72 MMHG | HEART RATE: 73 BPM | SYSTOLIC BLOOD PRESSURE: 132 MMHG | RESPIRATION RATE: 18 BRPM | TEMPERATURE: 99 F

## 2023-03-14 DIAGNOSIS — D50.0 IRON DEFICIENCY ANEMIA DUE TO CHRONIC BLOOD LOSS: Primary | ICD-10-CM

## 2023-03-14 PROCEDURE — 96374 THER/PROPH/DIAG INJ IV PUSH: CPT

## 2023-03-14 PROCEDURE — 63600175 PHARM REV CODE 636 W HCPCS: Performed by: STUDENT IN AN ORGANIZED HEALTH CARE EDUCATION/TRAINING PROGRAM

## 2023-03-14 RX ADMIN — IRON SUCROSE 200 MG: 20 INJECTION, SOLUTION INTRAVENOUS at 02:03

## 2023-03-14 NOTE — PLAN OF CARE
Patient arrived to unit for initial Venofer IVP. 5 total doses ordered. Patient denies iron infusions in the past. Education regarding possible side effects of Venofer reviewed with patient and father. Both verbalized understanding. Venofer infused over 5 mins IVP. Patient monitored for 30 mins post infusion. Denies any new or worsening symptoms following infusion. AVS and appt schedule reviewed with patient. Patient tolerated infusion well. Discharged off unit in no signs of acute distress ambulating independently accompanied by her father.

## 2023-03-20 ENCOUNTER — INFUSION (OUTPATIENT)
Dept: INFUSION THERAPY | Facility: HOSPITAL | Age: 28
End: 2023-03-20
Attending: STUDENT IN AN ORGANIZED HEALTH CARE EDUCATION/TRAINING PROGRAM
Payer: MEDICAID

## 2023-03-20 VITALS
OXYGEN SATURATION: 100 % | RESPIRATION RATE: 18 BRPM | HEART RATE: 90 BPM | SYSTOLIC BLOOD PRESSURE: 136 MMHG | DIASTOLIC BLOOD PRESSURE: 76 MMHG | TEMPERATURE: 99 F

## 2023-03-20 DIAGNOSIS — D50.0 IRON DEFICIENCY ANEMIA DUE TO CHRONIC BLOOD LOSS: Primary | ICD-10-CM

## 2023-03-20 PROCEDURE — 63600175 PHARM REV CODE 636 W HCPCS: Performed by: STUDENT IN AN ORGANIZED HEALTH CARE EDUCATION/TRAINING PROGRAM

## 2023-03-20 PROCEDURE — 96374 THER/PROPH/DIAG INJ IV PUSH: CPT

## 2023-03-20 RX ADMIN — IRON SUCROSE 200 MG: 20 INJECTION, SOLUTION INTRAVENOUS at 11:03

## 2023-03-28 ENCOUNTER — INFUSION (OUTPATIENT)
Dept: INFUSION THERAPY | Facility: HOSPITAL | Age: 28
End: 2023-03-28
Attending: STUDENT IN AN ORGANIZED HEALTH CARE EDUCATION/TRAINING PROGRAM
Payer: MEDICAID

## 2023-03-28 VITALS
DIASTOLIC BLOOD PRESSURE: 75 MMHG | SYSTOLIC BLOOD PRESSURE: 134 MMHG | TEMPERATURE: 98 F | HEART RATE: 88 BPM | OXYGEN SATURATION: 100 % | RESPIRATION RATE: 18 BRPM

## 2023-03-28 DIAGNOSIS — D50.0 IRON DEFICIENCY ANEMIA DUE TO CHRONIC BLOOD LOSS: Primary | ICD-10-CM

## 2023-03-28 PROCEDURE — 63600175 PHARM REV CODE 636 W HCPCS: Performed by: STUDENT IN AN ORGANIZED HEALTH CARE EDUCATION/TRAINING PROGRAM

## 2023-03-28 PROCEDURE — 96374 THER/PROPH/DIAG INJ IV PUSH: CPT

## 2023-03-28 RX ADMIN — IRON SUCROSE 200 MG: 20 INJECTION, SOLUTION INTRAVENOUS at 12:03

## 2023-03-28 NOTE — PLAN OF CARE
Pt received her third ordered dose of Venofer IVP. Five total doses ordered. Pt still continues with fatigue. Reports after receiving her iron that night of she gets a minor headache and stomach cramps that goes away the following day. VSS. Venofer given IVP over 5 minutes. Pt tolerated well. Will return next week for her fourth dose. Discharged from unit in [facility].

## 2023-03-29 ENCOUNTER — PATIENT MESSAGE (OUTPATIENT)
Dept: ADMINISTRATIVE | Facility: OTHER | Age: 28
End: 2023-03-29
Payer: MEDICAID

## 2023-04-04 ENCOUNTER — INFUSION (OUTPATIENT)
Dept: INFUSION THERAPY | Facility: HOSPITAL | Age: 28
End: 2023-04-04
Attending: STUDENT IN AN ORGANIZED HEALTH CARE EDUCATION/TRAINING PROGRAM
Payer: MEDICAID

## 2023-04-04 VITALS
OXYGEN SATURATION: 99 % | HEART RATE: 61 BPM | DIASTOLIC BLOOD PRESSURE: 70 MMHG | SYSTOLIC BLOOD PRESSURE: 155 MMHG | RESPIRATION RATE: 18 BRPM | TEMPERATURE: 97 F

## 2023-04-04 DIAGNOSIS — D50.0 IRON DEFICIENCY ANEMIA DUE TO CHRONIC BLOOD LOSS: Primary | ICD-10-CM

## 2023-04-04 PROCEDURE — 96374 THER/PROPH/DIAG INJ IV PUSH: CPT

## 2023-04-04 PROCEDURE — 63600175 PHARM REV CODE 636 W HCPCS: Performed by: STUDENT IN AN ORGANIZED HEALTH CARE EDUCATION/TRAINING PROGRAM

## 2023-04-04 RX ADMIN — IRON SUCROSE 200 MG: 20 INJECTION, SOLUTION INTRAVENOUS at 08:04

## 2023-04-04 NOTE — LETTER
April 4, 2023    [facility]  [address] [place] [address]  Phone: [phone]         Bailey Arita  [address] [place] [id number]        Bailey Arita    Was treated here on 04/04/2023    May Return to work/school on 4/4/2023    Light Duty.    Sincerely,          [facility]

## 2023-04-04 NOTE — PLAN OF CARE
Patient arrived to unit for 4/5 Venofer IVP infusion. Patient reported headache from previous infusion that later subsided on its own. Denies SOB, chest pain, itching or hives. Venofer infused over 5 mins IVP. Denied any new or worsening symptoms following infusion. Discharged from unit ambulating independently in no signs of acute distress.

## 2023-04-10 ENCOUNTER — INFUSION (OUTPATIENT)
Dept: INFUSION THERAPY | Facility: HOSPITAL | Age: 28
End: 2023-04-10
Attending: STUDENT IN AN ORGANIZED HEALTH CARE EDUCATION/TRAINING PROGRAM
Payer: MEDICAID

## 2023-04-10 VITALS
HEART RATE: 85 BPM | TEMPERATURE: 99 F | RESPIRATION RATE: 18 BRPM | SYSTOLIC BLOOD PRESSURE: 125 MMHG | DIASTOLIC BLOOD PRESSURE: 75 MMHG

## 2023-04-10 DIAGNOSIS — D50.0 IRON DEFICIENCY ANEMIA DUE TO CHRONIC BLOOD LOSS: Primary | ICD-10-CM

## 2023-04-10 PROCEDURE — 96374 THER/PROPH/DIAG INJ IV PUSH: CPT

## 2023-04-10 PROCEDURE — 63600175 PHARM REV CODE 636 W HCPCS: Performed by: STUDENT IN AN ORGANIZED HEALTH CARE EDUCATION/TRAINING PROGRAM

## 2023-04-10 RX ADMIN — IRON SUCROSE 200 MG: 20 INJECTION, SOLUTION INTRAVENOUS at 09:04

## 2023-04-10 NOTE — PLAN OF CARE
Anemia   Assist with determining underlying cause.  Promote rest; assist patient with energy-conserving measures to manage fatigue (e.g., cluster care, balance activity with periods of rest).  Implement strategies to prevent falls related to fatigue, weakness and dizziness, such as sitting before standing, seeking assistance with activity and decluttering area.  Promote optimal oral intake to support fluid balance and nutrition; monitor intake and output.  Assess for, and correct, nutritional deficiencies including iron, vitamin B12 or folic acid.

## 2023-04-20 ENCOUNTER — LAB VISIT (OUTPATIENT)
Dept: LAB | Facility: HOSPITAL | Age: 28
End: 2023-04-20
Attending: FAMILY MEDICINE
Payer: MEDICAID

## 2023-04-20 DIAGNOSIS — E78.00 PURE HYPERCHOLESTEROLEMIA: ICD-10-CM

## 2023-04-20 LAB
ALBUMIN SERPL BCP-MCNC: 4.1 G/DL (ref 3.5–5.2)
ALP SERPL-CCNC: 82 U/L (ref 55–135)
ALT SERPL W/O P-5'-P-CCNC: 16 U/L (ref 10–44)
ANION GAP SERPL CALC-SCNC: 10 MMOL/L (ref 8–16)
AST SERPL-CCNC: 24 U/L (ref 10–40)
BILIRUB SERPL-MCNC: 0.3 MG/DL (ref 0.1–1)
BUN SERPL-MCNC: 11 MG/DL (ref 6–20)
CALCIUM SERPL-MCNC: 9.7 MG/DL (ref 8.7–10.5)
CHLORIDE SERPL-SCNC: 105 MMOL/L (ref 95–110)
CHOLEST SERPL-MCNC: 279 MG/DL (ref 120–199)
CHOLEST/HDLC SERPL: 2.9 {RATIO} (ref 2–5)
CO2 SERPL-SCNC: 27 MMOL/L (ref 23–29)
CREAT SERPL-MCNC: 0.9 MG/DL (ref 0.5–1.4)
EST. GFR  (NO RACE VARIABLE): >60 ML/MIN/1.73 M^2
GLUCOSE SERPL-MCNC: 92 MG/DL (ref 70–110)
HDLC SERPL-MCNC: 95 MG/DL (ref 40–75)
HDLC SERPL: 34.1 % (ref 20–50)
LDLC SERPL CALC-MCNC: 170.2 MG/DL (ref 63–159)
NONHDLC SERPL-MCNC: 184 MG/DL
POTASSIUM SERPL-SCNC: 4.1 MMOL/L (ref 3.5–5.1)
PROT SERPL-MCNC: 7.6 G/DL (ref 6–8.4)
SODIUM SERPL-SCNC: 142 MMOL/L (ref 136–145)
TRIGL SERPL-MCNC: 69 MG/DL (ref 30–150)

## 2023-04-20 PROCEDURE — 80061 LIPID PANEL: CPT | Performed by: FAMILY MEDICINE

## 2023-04-20 PROCEDURE — 36415 COLL VENOUS BLD VENIPUNCTURE: CPT | Mod: PN | Performed by: FAMILY MEDICINE

## 2023-04-20 PROCEDURE — 80053 COMPREHEN METABOLIC PANEL: CPT | Performed by: FAMILY MEDICINE

## 2023-04-27 ENCOUNTER — OFFICE VISIT (OUTPATIENT)
Dept: FAMILY MEDICINE | Facility: CLINIC | Age: 28
End: 2023-04-27
Payer: MEDICAID

## 2023-04-27 ENCOUNTER — PATIENT MESSAGE (OUTPATIENT)
Dept: FAMILY MEDICINE | Facility: CLINIC | Age: 28
End: 2023-04-27

## 2023-04-27 VITALS
TEMPERATURE: 99 F | HEIGHT: 61 IN | BODY MASS INDEX: 31.8 KG/M2 | WEIGHT: 168.44 LBS | HEART RATE: 93 BPM | SYSTOLIC BLOOD PRESSURE: 138 MMHG | OXYGEN SATURATION: 98 % | DIASTOLIC BLOOD PRESSURE: 82 MMHG

## 2023-04-27 DIAGNOSIS — G44.229 CHRONIC TENSION-TYPE HEADACHE, NOT INTRACTABLE: Chronic | ICD-10-CM

## 2023-04-27 DIAGNOSIS — I10 HYPERTENSION, BENIGN: Primary | Chronic | ICD-10-CM

## 2023-04-27 DIAGNOSIS — E78.00 PURE HYPERCHOLESTEROLEMIA: Chronic | ICD-10-CM

## 2023-04-27 PROBLEM — G89.29 CHRONIC NONINTRACTABLE HEADACHE: Chronic | Status: ACTIVE | Noted: 2023-02-17

## 2023-04-27 PROBLEM — R51.9 CHRONIC NONINTRACTABLE HEADACHE: Chronic | Status: ACTIVE | Noted: 2023-02-17

## 2023-04-27 PROCEDURE — 99214 OFFICE O/P EST MOD 30 MIN: CPT | Mod: PBBFAC,PN | Performed by: FAMILY MEDICINE

## 2023-04-27 PROCEDURE — 3079F PR MOST RECENT DIASTOLIC BLOOD PRESSURE 80-89 MM HG: ICD-10-PCS | Mod: CPTII,,, | Performed by: FAMILY MEDICINE

## 2023-04-27 PROCEDURE — 3008F BODY MASS INDEX DOCD: CPT | Mod: CPTII,,, | Performed by: FAMILY MEDICINE

## 2023-04-27 PROCEDURE — 3075F PR MOST RECENT SYSTOLIC BLOOD PRESS GE 130-139MM HG: ICD-10-PCS | Mod: CPTII,,, | Performed by: FAMILY MEDICINE

## 2023-04-27 PROCEDURE — 99214 PR OFFICE/OUTPT VISIT, EST, LEVL IV, 30-39 MIN: ICD-10-PCS | Mod: S$PBB,,, | Performed by: FAMILY MEDICINE

## 2023-04-27 PROCEDURE — 1159F MED LIST DOCD IN RCRD: CPT | Mod: CPTII,,, | Performed by: FAMILY MEDICINE

## 2023-04-27 PROCEDURE — 1159F PR MEDICATION LIST DOCUMENTED IN MEDICAL RECORD: ICD-10-PCS | Mod: CPTII,,, | Performed by: FAMILY MEDICINE

## 2023-04-27 PROCEDURE — 99999 PR PBB SHADOW E&M-EST. PATIENT-LVL IV: CPT | Mod: PBBFAC,,, | Performed by: FAMILY MEDICINE

## 2023-04-27 PROCEDURE — 99214 OFFICE O/P EST MOD 30 MIN: CPT | Mod: S$PBB,,, | Performed by: FAMILY MEDICINE

## 2023-04-27 PROCEDURE — 3008F PR BODY MASS INDEX (BMI) DOCUMENTED: ICD-10-PCS | Mod: CPTII,,, | Performed by: FAMILY MEDICINE

## 2023-04-27 PROCEDURE — 3075F SYST BP GE 130 - 139MM HG: CPT | Mod: CPTII,,, | Performed by: FAMILY MEDICINE

## 2023-04-27 PROCEDURE — 99999 PR PBB SHADOW E&M-EST. PATIENT-LVL IV: ICD-10-PCS | Mod: PBBFAC,,, | Performed by: FAMILY MEDICINE

## 2023-04-27 PROCEDURE — 1160F RVW MEDS BY RX/DR IN RCRD: CPT | Mod: CPTII,,, | Performed by: FAMILY MEDICINE

## 2023-04-27 PROCEDURE — 1160F PR REVIEW ALL MEDS BY PRESCRIBER/CLIN PHARMACIST DOCUMENTED: ICD-10-PCS | Mod: CPTII,,, | Performed by: FAMILY MEDICINE

## 2023-04-27 PROCEDURE — 3079F DIAST BP 80-89 MM HG: CPT | Mod: CPTII,,, | Performed by: FAMILY MEDICINE

## 2023-04-27 RX ORDER — ATORVASTATIN CALCIUM 20 MG/1
20 TABLET, FILM COATED ORAL DAILY
Qty: 90 TABLET | Refills: 3 | Status: SHIPPED | OUTPATIENT
Start: 2023-04-27 | End: 2023-05-28 | Stop reason: SDUPTHER

## 2023-04-27 RX ORDER — LABETALOL 100 MG/1
100 TABLET, FILM COATED ORAL 2 TIMES DAILY
Qty: 60 TABLET | Refills: 0 | Status: SHIPPED | OUTPATIENT
Start: 2023-04-27 | End: 2023-06-04 | Stop reason: SDUPTHER

## 2023-04-27 NOTE — PATIENT INSTRUCTIONS
You can take the atorvastatin with the amlodipine in the evening  Take the labetalol twice a day. You can take one of the doses with the evening amlodipine and atorvastatin.    We can recheck the lipids in 4-6 weeks along with BP test

## 2023-04-30 PROBLEM — E78.00 PURE HYPERCHOLESTEROLEMIA: Status: ACTIVE | Noted: 2023-02-17

## 2023-04-30 PROBLEM — I10 HYPERTENSION, BENIGN: Chronic | Status: ACTIVE | Noted: 2023-02-17

## 2023-04-30 NOTE — PROGRESS NOTES
"  Patient Name: Bailey Arita    : 1995  MRN: [id number]      Subjective:     Patient ID: Bailey is a 27 y.o. female    Chief Complaint:  Hypertension and Hyperlipidemia    Hypertension  This is a chronic problem. Associated symptoms include headaches. Pertinent negatives include no chest pain, neck pain, palpitations, peripheral edema, shortness of breath or sweats. Risk factors for coronary artery disease include obesity and dyslipidemia. Past treatments include calcium channel blockers. The current treatment provides moderate improvement. There are no compliance problems.  There is no history of angina, kidney disease, CAD/MI, CVA, heart failure or left ventricular hypertrophy.   Hyperlipidemia  This is a chronic problem. Exacerbating diseases include obesity. Pertinent negatives include no chest pain or shortness of breath. Current antihyperlipidemic treatment includes diet change. The current treatment provides no improvement of lipids.      Review of Systems   Constitutional:  Negative for activity change and unexpected weight change.   HENT:  Negative for hearing loss, rhinorrhea and trouble swallowing.    Eyes:  Negative for discharge and visual disturbance.   Respiratory:  Negative for chest tightness, shortness of breath and wheezing.    Cardiovascular:  Negative for chest pain and palpitations.   Gastrointestinal:  Negative for blood in stool, diarrhea and vomiting.   Endocrine: Negative for polydipsia and polyuria.   Genitourinary:  Negative for difficulty urinating, dysuria and hematuria.   Musculoskeletal:  Negative for arthralgias, joint swelling and neck pain.   Neurological:  Positive for headaches. Negative for weakness.   Psychiatric/Behavioral:  Negative for confusion and dysphoric mood.       Objective:   /82 (BP Location: Right arm, Patient Position: Sitting, BP Method: Medium (Manual))   Pulse 93   Temp 98.5 °F (36.9 °C) (Oral)   Ht 5' 1" (1.549 m)   Wt 76.4 kg (168 lb 6.9 oz)  "  LMP 04/10/2023 (Exact Date)   SpO2 98%   BMI 31.82 kg/m²     Physical Exam  Vitals reviewed.   Constitutional:       General: She is not in acute distress.     Appearance: She is well-developed. She is obese.   HENT:      Head: Normocephalic and atraumatic.      Right Ear: External ear normal.      Left Ear: External ear normal.      Nose: Nose normal.      Mouth/Throat:      Pharynx: No oropharyngeal exudate.   Eyes:      General:         Right eye: No discharge.         Left eye: No discharge.      Conjunctiva/sclera: Conjunctivae normal.      Pupils: Pupils are equal, round, and reactive to light.   Neck:      Trachea: No tracheal deviation.   Cardiovascular:      Rate and Rhythm: Normal rate and regular rhythm.      Heart sounds: Normal heart sounds. No murmur heard.  Pulmonary:      Effort: Pulmonary effort is normal.      Breath sounds: Normal breath sounds. No wheezing or rales.   Abdominal:      General: Bowel sounds are normal.      Palpations: Abdomen is soft. Abdomen is not rigid. There is no mass.      Tenderness: There is no abdominal tenderness. There is no guarding.   Musculoskeletal:      Cervical back: Normal range of motion and neck supple.   Lymphadenopathy:      Cervical: No cervical adenopathy.   Neurological:      Mental Status: She is alert and oriented to person, place, and time.      Gait: Gait normal.      Assessment        ICD-10-CM ICD-9-CM   1. Hypertension, benign  I10 401.1   2. Pure hypercholesterolemia  E78.00 272.0   3. Chronic tension-type headache, not intractable  G44.229 339.12         Plan:     1. Hypertension, benign  Assessment & Plan:  BP Readings from Last 3 Encounters:   04/27/23 138/82   04/10/23 125/75   04/04/23 (!) 155/70     Blood pressure is not at goal.  Will continue Norvasc 10 milligrams, and add labetalol 100 milligrams b.i.d..  Recheck blood pressure in the next few weeks.    Orders:  -     labetaloL (NORMODYNE) 100 MG tablet; Take 1 tablet (100 mg total) by  mouth 2 (two) times daily.  Dispense: 60 tablet; Refill: 0    2. Pure hypercholesterolemia  Overview:  Lab Results   Component Value Date    CHOL 279 (H) 04/20/2023    CHOL 276 (H) 10/06/2022    CHOL 272 (H) 04/04/2022     Lab Results   Component Value Date    HDL 95 (H) 04/20/2023    HDL 89 (H) 10/06/2022    HDL 85 (H) 04/04/2022     Lab Results   Component Value Date    LDLCALC 170.2 (H) 04/20/2023    LDLCALC 163.2 (H) 10/06/2022    LDLCALC 168.8 (H) 04/04/2022     Lab Results   Component Value Date    TRIG 69 04/20/2023    TRIG 119 10/06/2022    TRIG 91 04/04/2022       Assessment & Plan:  Given LDL levels, strongly recommended starting statin medication.  Patient currently has an IUD and has no plans for pregnancy anytime soon.  Patient understands that if she starts contemplating pregnancy, we need to stop the atorvastatin immediately as it is kind indicated during pregnancy.  Recheck lipids and hepatic function in 4 to 6 weeks.    Orders:  -     atorvastatin (LIPITOR) 20 MG tablet; Take 1 tablet (20 mg total) by mouth once daily.  Dispense: 90 tablet; Refill: 3    3. Chronic tension-type headache, not intractable  Assessment & Plan:  Has appt with Dr. Lázaro Vela on May 5th, at [facility]    Orders:  -     Comprehensive Metabolic Panel; Future; Expected date: 05/27/2023  -     Lipid Panel; Future; Expected date: 05/27/2023           -Rodger Conteh Jr., MD, AAHIVS      This office note has been dictated.  This dictation has been generated using M-Modal Fluency Direct dictation; some phonetic errors may occur.         Patient Instructions   You can take the atorvastatin with the amlodipine in the evening  Take the labetalol twice a day. You can take one of the doses with the evening amlodipine and atorvastatin.    We can recheck the lipids in 4-6 weeks along with BP test      Future Appointments   Date Time Provider Department Center   5/5/2023 10:00 AM Lázaro Vela DO [facility] NEURO [place]  [facility]   5/17/2023  9:30 AM LAB, [facility] DRAW STATION [facility] LAB [facility]   5/22/2023  3:30 PM Camelia Khalil MD [facility] HEM ONC [facility] [place]   10/27/2023  8:00 AM LAB, [facility] DRAW STATION [facility] LAB [facility]   11/2/2023  2:20 PM Rodger Conteh Jr., MD [facility]

## 2023-04-30 NOTE — ASSESSMENT & PLAN NOTE
Given LDL levels, strongly recommended starting statin medication.  Patient currently has an IUD and has no plans for pregnancy anytime soon.  Patient understands that if she starts contemplating pregnancy, we need to stop the atorvastatin immediately as it is kind indicated during pregnancy.  Recheck lipids and hepatic function in 4 to 6 weeks.

## 2023-04-30 NOTE — ASSESSMENT & PLAN NOTE
BP Readings from Last 3 Encounters:   04/27/23 138/82   04/10/23 125/75   04/04/23 (!) 155/70     Blood pressure is not at goal.  Will continue Norvasc 10 milligrams, and add labetalol 100 milligrams b.i.d..  Recheck blood pressure in the next few weeks.

## 2023-05-05 ENCOUNTER — OFFICE VISIT (OUTPATIENT)
Dept: NEUROLOGY | Facility: CLINIC | Age: 28
End: 2023-05-05
Payer: MEDICAID

## 2023-05-05 VITALS — SYSTOLIC BLOOD PRESSURE: 111 MMHG | DIASTOLIC BLOOD PRESSURE: 71 MMHG

## 2023-05-05 VITALS
SYSTOLIC BLOOD PRESSURE: 118 MMHG | HEIGHT: 61 IN | HEART RATE: 81 BPM | DIASTOLIC BLOOD PRESSURE: 80 MMHG | WEIGHT: 168.44 LBS | BODY MASS INDEX: 31.8 KG/M2

## 2023-05-05 DIAGNOSIS — G43.009 MIGRAINE WITHOUT AURA AND WITHOUT STATUS MIGRAINOSUS, NOT INTRACTABLE: ICD-10-CM

## 2023-05-05 PROCEDURE — 1160F PR REVIEW ALL MEDS BY PRESCRIBER/CLIN PHARMACIST DOCUMENTED: ICD-10-PCS | Mod: CPTII,,, | Performed by: PSYCHIATRY & NEUROLOGY

## 2023-05-05 PROCEDURE — 3079F DIAST BP 80-89 MM HG: CPT | Mod: CPTII,,, | Performed by: PSYCHIATRY & NEUROLOGY

## 2023-05-05 PROCEDURE — 99999 PR PBB SHADOW E&M-EST. PATIENT-LVL IV: CPT | Mod: PBBFAC,,, | Performed by: PSYCHIATRY & NEUROLOGY

## 2023-05-05 PROCEDURE — 3008F BODY MASS INDEX DOCD: CPT | Mod: CPTII,,, | Performed by: PSYCHIATRY & NEUROLOGY

## 2023-05-05 PROCEDURE — 3074F PR MOST RECENT SYSTOLIC BLOOD PRESSURE < 130 MM HG: ICD-10-PCS | Mod: CPTII,,, | Performed by: PSYCHIATRY & NEUROLOGY

## 2023-05-05 PROCEDURE — 3079F PR MOST RECENT DIASTOLIC BLOOD PRESSURE 80-89 MM HG: ICD-10-PCS | Mod: CPTII,,, | Performed by: PSYCHIATRY & NEUROLOGY

## 2023-05-05 PROCEDURE — 3008F PR BODY MASS INDEX (BMI) DOCUMENTED: ICD-10-PCS | Mod: CPTII,,, | Performed by: PSYCHIATRY & NEUROLOGY

## 2023-05-05 PROCEDURE — 1159F MED LIST DOCD IN RCRD: CPT | Mod: CPTII,,, | Performed by: PSYCHIATRY & NEUROLOGY

## 2023-05-05 PROCEDURE — 1160F RVW MEDS BY RX/DR IN RCRD: CPT | Mod: CPTII,,, | Performed by: PSYCHIATRY & NEUROLOGY

## 2023-05-05 PROCEDURE — 3074F SYST BP LT 130 MM HG: CPT | Mod: CPTII,,, | Performed by: PSYCHIATRY & NEUROLOGY

## 2023-05-05 PROCEDURE — 1159F PR MEDICATION LIST DOCUMENTED IN MEDICAL RECORD: ICD-10-PCS | Mod: CPTII,,, | Performed by: PSYCHIATRY & NEUROLOGY

## 2023-05-05 PROCEDURE — 99999 PR PBB SHADOW E&M-EST. PATIENT-LVL IV: ICD-10-PCS | Mod: PBBFAC,,, | Performed by: PSYCHIATRY & NEUROLOGY

## 2023-05-05 PROCEDURE — 99204 OFFICE O/P NEW MOD 45 MIN: CPT | Mod: S$PBB,,, | Performed by: PSYCHIATRY & NEUROLOGY

## 2023-05-05 PROCEDURE — 99204 PR OFFICE/OUTPT VISIT, NEW, LEVL IV, 45-59 MIN: ICD-10-PCS | Mod: S$PBB,,, | Performed by: PSYCHIATRY & NEUROLOGY

## 2023-05-05 PROCEDURE — 99214 OFFICE O/P EST MOD 30 MIN: CPT | Mod: PBBFAC,PO | Performed by: PSYCHIATRY & NEUROLOGY

## 2023-05-05 RX ORDER — RIZATRIPTAN BENZOATE 10 MG/1
10 TABLET, ORALLY DISINTEGRATING ORAL
Qty: 12 TABLET | Refills: 11 | Status: SHIPPED | OUTPATIENT
Start: 2023-05-05 | End: 2024-01-31

## 2023-05-05 NOTE — PROGRESS NOTES
[facility] - HEADACHE MEDICINE    CHIEF COMPLAINT?   Ms. Bailey Arita chief concern is uncontrolled headache.     Name of the referring physician Rodger Conteh Jr., MD     Name of primary care provider Rodger Conteh Jr, MD      Subjective:    HPI:  Ms. Bailey Arita presents today to discuss their ongoing uncontrolled headaches.     Age of onset: 26  When did they become more of a problem: about 1 yr  # of Total headache days per month: 3-4  # of Migraine or severe days per month: 3-4  This rate has been present >3 months: yes  Intensity of average and most severe headaches: 5/10, 7/10  Duration of headache pain: >4 hrs  Quality of pain: Pulsating/Throbbing  Laterality: bilateral  Location: frontal, whole head  Photophobia and phonophobia: yes  Nausea and vomiting: no  Causes avoidance of physical activity: yes  Worsened by physical activity: no  Aura: no  Autonomic symptoms: no  Family Hx of migraines: none  Supplements: multi  Birth Control: alexei  Have qualities and features been stable for >2 years: NO  Has yearly eye exams, has one scheduled for 05/19/23    Preventive Medications failed: labetalol (>3 months, ineffective)  Acute medications failed: fioricet (ineffective)    OTC Medications: excedrin (<5 days per month)  Is medication overuse contributing to the patient's current migraine burden: no    Hx of (Bolded items are positive): depression, anxiety, fibromyalgia, autoimmune disorder, head trauma, neurological infection, glaucoma, asthma, nephrolithiasis, MI, Stroke, Raynaud's phen.    Current HA Regimen:  Excedrin  labetalol    LATEST IMAGING:  None    PAST MEDICAL HISTORY:  History reviewed. No pertinent past medical history.    PAST SURGICAL HISTORY:  Past Surgical History:   Procedure Laterality Date    APPENDECTOMY      BREAST SURGERY      KIDNEY SURGERY      Blocked tube       CURRENT MEDS:  Current Outpatient Medications   Medication Sig Dispense Refill    amLODIPine (NORVASC) 10 MG  "tablet Take 1 tablet (10 mg total) by mouth once daily. 90 tablet 3    atorvastatin (LIPITOR) 20 MG tablet Take 1 tablet (20 mg total) by mouth once daily. 90 tablet 3    labetaloL (NORMODYNE) 100 MG tablet Take 1 tablet (100 mg total) by mouth 2 (two) times daily. 60 tablet 0    levonorgestreL (RONAK) 14 mcg/24 hrs (3 yrs) 13.5 mg IUD 1 Intra Uterine Device by Intrauterine route. Placed by Dr. Christiane Rosado MD on 4/14/23      rizatriptan (MAXALT-MLT) 10 MG disintegrating tablet Take 1 tablet (10 mg total) by mouth as needed for Migraine. May repeat in 2 hours if needed. Max 20mg/24hrs. 12 tablet 11     No current facility-administered medications for this visit.       ALLERGIES:  Review of patient's allergies indicates:  No Known Allergies    FAMILY HISTORY:  Family History   Problem Relation Age of Onset    Breast cancer Neg Hx     Colon cancer Neg Hx     Ovarian cancer Neg Hx        SOCIAL HISTORY:  Social History     Tobacco Use    Smoking status: Never    Smokeless tobacco: Never   Substance Use Topics    Alcohol use: No    Drug use: No       Review of Systems:  Gen: no fever, no chills, no generalized feeling of weakness   HEENT: no double vision, no blurred vision, no eye pain, no eye exudates.    Heart: no chest pain, no SOB    Lungs: no SOB, no cough    MSK: no weakness of legs, intact ROM    ABD: no abd pain, no N/V/D/C, no difficulty with defecation .    Extremities: No leg pain, no edema.    Objective:  PHYSICAL EXAMINATION??   Bailey Arita is a 27 y.o. female patient who has the following vital signs with   Vitals:    05/05/23 1003   BP: 118/80   BP Location: Right arm   Patient Position: Sitting   Pulse: 81   Weight: 76.4 kg (168 lb 6.9 oz)   Height: 5' 1" (1.549 m)   , body surface area is 1.81 meters squared.     General: female in NAD, alert and awake, Aox3, well groomed. ?    ? ?    HEENT: Head is NC/AT EOMI, pupil size: 4 mm B/L, no nystagmus noted; hearing grossly intact b/l. Mucous membrane " moist, uvula midline, no pharyngeal erythema,exudates or discharges.     Neck: Supple. no nuchal rigidity.      Cardiovascular: well perfused, no cyanosis        Respiratory: Symmetric chest rise noted       Musculoskeletal: Muscle tone noted to be adequate for patient age, muscle mass is WNL. No spontaneous movements or fasciculations noted during this examination.       Extremities: No pedal edema or calf tenderness. No cogwheel rigidity noted on B/L UE extremities.       Neurological Examination.    Mental status: AA&O x3; Affect/mood is euthymic/congruent; no aphasia noted during examination. Patient answers simple questions appropriately & follows simple commands; no dysarthria or expressive aphasia; no yoanna-neglect or extinction. Vocabulary/word finding: excellent.       Cranial Nerves: II-XII grossly intact bilaterally.     Muscle Function: Tone WNL and Muscle bulk WNL. Full and symmetric strength, 5/5, throughout.      Sensory: light touch is grossly intact in bilateral upper and lower extremities and face.     Reflexes: 2/4 throughout     Gait: adequate casual gait with stride length and arm swing WNL.     REVIEW   We reviewed their current medical history, medications, allergies, past medical history, surgical history, social history, family history, and review of systems, and updated all the information.     ASSESSMENT AND PLAN   Bailey? is a very pleasant, 27 y.o. with complicated diagnosis of     1. Migraine without aura and without status migrainosus, not intractable  Ambulatory referral/consult to Neurology    rizatriptan (MAXALT-MLT) 10 MG disintegrating tablet    CT Head Without Contrast        Bailey has worsening headaches that meet International Headache Society Criteria for episodic migraine without aura.  As the patient no significant headache history prior to 1 year ago, neurological imaging is indicated.  Medication overuse is not likely contributing to this patient's headaches.     Patient  feels not only severe disabling pain, but is also unable to function due to headaches when they are present.     My approach to every patient is multimodal.   I focused our discussion on addressing modifiable risk factors and trying to decrease them. After discussion with the patient, I recommend the followin. Preventive medications:    Preventive medications were discussed but deferred for now.  I endorse continued use of labetalol for blood pressure as this likely provide some degree of migraine coverage as well.     2. Acute (abortive) medications- these medications are to be taken only at the onset of severe headache, and please limit the total of any combination of these medications to less than 10 days per month on average because they can cause medication overuse headaches.   Failed:   None    START:  Rizatriptan   RIZATRIPTAN (MAXALT) is used as migraine abortive used only when you feel a migraine coming. Use 10 mg at the onset of migraine; take another 10 mg in 2 hours if headache pain remains. Rizatriptan is more effective if taken with 2 Aleve blue capsules (Naproxen 440mg).   Do NOT take Maxalt more than 6-8 days per month as this can cause medication overuse headache.   Do NOT take Maxalt within 24 hours of taking any other triptan or within 24 hours of taking any ergotamine medication such as DHE or Migranal.    Pregnancy and Breastfeeding: FDA pregnancy category C. This means that it is not known whether this drug will harm an unborn baby.    It is not known whether this medication passes into breast milk.    Potential side effects:   Check with your doctor right away if any of the following side effects continue for more than 1 hour. Even if they go away in less than 1 hour, check with your doctor before using any more sumatriptan if any of the following side effects occur: chest pain (mild), heaviness, tightness, or pressure in chest and/or neck ,difficulty in swallowing ,pounding heartbeat  , rash, hives, itching, or bumps on skin     3. Natural approaches to increasing brain energy and serotonin:    SAMe 200 mg a day    Vitamin B2 400 mg daily    Vitamin B12 1000 mcg daily    Getting early morning light to increase natural serotonin, melatonin. Could also consider light therapy box, 10,000 LUX.     4. Headache prevention and acute treatment over-the counter supplements    Boswellia 800 mg 2-3 times per day, example brand Yusra's, natural anti inflammatory herb    Sleep? modulation- melatonin at night 5 to 10 mg 30 minutes prior to sleep    Feverfew Tea 1-3 cups per day. Can get from [employer] or [employer]- A Natural anti inflammatory approach that does not cause further sensitization of the system.    Magnesium Citrate 250 mg daily, slowly increase up to 500-1000 mg daily. Side effect may include diarrhea, so we recommend stopping at whatever dose is comfortable for your body without causing this side effect. This supplement can be very helpful for preventing headache, preventing migraine aura, calming nerves, muscles and even mood. Recommend starting slowly, as it can also lead to increased bowel movements or diarrhea.?     5. DIET: I recommend a diet that heavily favors fruits and vegetables while reducing carbs. More information is available upon request.     6. EXERCISE: Gentle movement exercises, concentrate on combination of muscle building and aerobic. I also recommend adding gentle Yoga therapy. The goal is 150 minutes per week of moderate to rigorous exercise, but you should start at your own pace and progress slowly and safely as discussed today.    7. ANXIETY/STRESS- Control stressors with yoga, meditation, counseling, or other methods of mindfulness.     8. SLEEP- aim for 7-8 hrs of restful sleep per night.     9. FUN- Increase fun time spend with friends and family     10. CT head without contrast ordered for further case definition given new onset migraines within the last  year.    Benefits, side effects, and alternatives were discussed extensively with the patient.?     Bailey verbally endorsed understanding of all the recommendations.?     she is going to follow up with our clinic as needed moving forward.  She was instructed to reach out to us via chart message if rizatriptan has side effects or is found to be ineffective so that other options may be explored.  We will communicate results of CT head via chart message or phone when they become available.?     I spent a total of 45 minutes on the day of the visit. This includes face to face time and non-face to face time preparing to see the patient (eg, review of tests), obtaining and/or reviewing separately obtained history, documenting clinical information in the electronic or other health record, independently interpreting results and communicating results to the patient/family/caregiver, or care coordinator.    A dictation device was used to produce this documentation which can sometimes result in grammatical errors or the replacement of similar sounding words.    Lázaro Vela, DO  Headache Medicine

## 2023-05-08 ENCOUNTER — PATIENT MESSAGE (OUTPATIENT)
Dept: FAMILY MEDICINE | Facility: CLINIC | Age: 28
End: 2023-05-08
Payer: MEDICAID

## 2023-05-10 ENCOUNTER — HOSPITAL ENCOUNTER (OUTPATIENT)
Dept: RADIOLOGY | Facility: HOSPITAL | Age: 28
Discharge: HOME | End: 2023-05-10
Attending: PSYCHIATRY & NEUROLOGY
Payer: MEDICAID

## 2023-05-10 DIAGNOSIS — G43.009 MIGRAINE WITHOUT AURA AND WITHOUT STATUS MIGRAINOSUS, NOT INTRACTABLE: ICD-10-CM

## 2023-05-10 PROCEDURE — 70450 CT HEAD/BRAIN W/O DYE: CPT | Mod: TC

## 2023-05-12 ENCOUNTER — PATIENT MESSAGE (OUTPATIENT)
Dept: NEUROLOGY | Facility: CLINIC | Age: 28
End: 2023-05-12
Payer: MEDICAID

## 2023-05-17 ENCOUNTER — LAB VISIT (OUTPATIENT)
Dept: LAB | Facility: HOSPITAL | Age: 28
End: 2023-05-17
Attending: STUDENT IN AN ORGANIZED HEALTH CARE EDUCATION/TRAINING PROGRAM
Payer: MEDICAID

## 2023-05-17 DIAGNOSIS — D50.0 IRON DEFICIENCY ANEMIA DUE TO CHRONIC BLOOD LOSS: ICD-10-CM

## 2023-05-17 DIAGNOSIS — D64.9 NORMOCYTIC ANEMIA: ICD-10-CM

## 2023-05-17 LAB
BASOPHILS # BLD AUTO: 0.04 K/UL (ref 0–0.2)
BASOPHILS NFR BLD: 0.8 % (ref 0–1.9)
DIFFERENTIAL METHOD: ABNORMAL
EOSINOPHIL # BLD AUTO: 0.2 K/UL (ref 0–0.5)
EOSINOPHIL NFR BLD: 3.6 % (ref 0–8)
ERYTHROCYTE [DISTWIDTH] IN BLOOD BY AUTOMATED COUNT: 14.5 % (ref 11.5–14.5)
HCT VFR BLD AUTO: 39.8 % (ref 37–48.5)
HGB BLD-MCNC: 12.7 G/DL (ref 12–16)
IMM GRANULOCYTES # BLD AUTO: 0.01 K/UL (ref 0–0.04)
IMM GRANULOCYTES NFR BLD AUTO: 0.2 % (ref 0–0.5)
IRON SERPL-MCNC: 107 UG/DL (ref 30–160)
LYMPHOCYTES # BLD AUTO: 1.5 K/UL (ref 1–4.8)
LYMPHOCYTES NFR BLD: 30.3 % (ref 18–48)
MCH RBC QN AUTO: 28.5 PG (ref 27–31)
MCHC RBC AUTO-ENTMCNC: 31.9 G/DL (ref 32–36)
MCV RBC AUTO: 89 FL (ref 82–98)
MONOCYTES # BLD AUTO: 0.3 K/UL (ref 0.3–1)
MONOCYTES NFR BLD: 5.8 % (ref 4–15)
NEUTROPHILS # BLD AUTO: 3 K/UL (ref 1.8–7.7)
NEUTROPHILS NFR BLD: 59.3 % (ref 38–73)
NRBC BLD-RTO: 0 /100 WBC
PATH REV BLD -IMP: NORMAL
PATH REV BLD -IMP: NORMAL
PLATELET # BLD AUTO: 240 K/UL (ref 150–450)
PMV BLD AUTO: 10.7 FL (ref 9.2–12.9)
RBC # BLD AUTO: 4.45 M/UL (ref 4–5.4)
SATURATED IRON: 30 % (ref 20–50)
TOTAL IRON BINDING CAPACITY: 355 UG/DL (ref 250–450)
TRANSFERRIN SERPL-MCNC: 240 MG/DL (ref 200–375)
WBC # BLD AUTO: 4.99 K/UL (ref 3.9–12.7)

## 2023-05-17 PROCEDURE — 85025 COMPLETE CBC W/AUTO DIFF WBC: CPT | Performed by: STUDENT IN AN ORGANIZED HEALTH CARE EDUCATION/TRAINING PROGRAM

## 2023-05-17 PROCEDURE — 36415 COLL VENOUS BLD VENIPUNCTURE: CPT | Mod: PN | Performed by: STUDENT IN AN ORGANIZED HEALTH CARE EDUCATION/TRAINING PROGRAM

## 2023-05-17 PROCEDURE — 84466 ASSAY OF TRANSFERRIN: CPT | Performed by: STUDENT IN AN ORGANIZED HEALTH CARE EDUCATION/TRAINING PROGRAM

## 2023-05-17 PROCEDURE — 83020 HEMOGLOBIN ELECTROPHORESIS: CPT | Mod: 91 | Performed by: STUDENT IN AN ORGANIZED HEALTH CARE EDUCATION/TRAINING PROGRAM

## 2023-05-17 PROCEDURE — 85060 PATHOLOGIST REVIEW: ICD-10-PCS | Mod: ,,, | Performed by: PATHOLOGY

## 2023-05-17 PROCEDURE — 84238 ASSAY NONENDOCRINE RECEPTOR: CPT | Performed by: STUDENT IN AN ORGANIZED HEALTH CARE EDUCATION/TRAINING PROGRAM

## 2023-05-17 PROCEDURE — 85060 BLOOD SMEAR INTERPRETATION: CPT | Mod: ,,, | Performed by: PATHOLOGY

## 2023-05-17 PROCEDURE — 83921 ORGANIC ACID SINGLE QUANT: CPT | Performed by: STUDENT IN AN ORGANIZED HEALTH CARE EDUCATION/TRAINING PROGRAM

## 2023-05-17 PROCEDURE — 82525 ASSAY OF COPPER: CPT | Performed by: STUDENT IN AN ORGANIZED HEALTH CARE EDUCATION/TRAINING PROGRAM

## 2023-05-19 LAB
HB ELECTROPHORESIS INTERP CANCEL: ABNORMAL
HB ELECTROPHORESIS INTERPRETATION: ABNORMAL
HGB A MFR BLD ELPH: 95.9 % (ref 95.8–98)
HGB A2 MFR BLD: 2.7 % (ref 2–3.3)
HGB A2+XXX MFR BLD ELPH: ABNORMAL %
HGB F MFR BLD: 1.4 % (ref 0–0.9)
HGB XXX MFR BLD ELPH: ABNORMAL %
HPLC HB VARIANT: ABNORMAL
STFR SERPL-MCNC: 2.4 MG/L (ref 1.8–4.6)

## 2023-05-22 ENCOUNTER — OFFICE VISIT (OUTPATIENT)
Dept: HEMATOLOGY/ONCOLOGY | Facility: CLINIC | Age: 28
End: 2023-05-22
Payer: MEDICAID

## 2023-05-22 VITALS
BODY MASS INDEX: 30.93 KG/M2 | HEART RATE: 83 BPM | DIASTOLIC BLOOD PRESSURE: 82 MMHG | OXYGEN SATURATION: 100 % | SYSTOLIC BLOOD PRESSURE: 125 MMHG | WEIGHT: 163.81 LBS | HEIGHT: 61 IN

## 2023-05-22 DIAGNOSIS — D64.9 NORMOCYTIC ANEMIA: Primary | ICD-10-CM

## 2023-05-22 DIAGNOSIS — Z09 FOLLOW-UP EXAM: ICD-10-CM

## 2023-05-22 DIAGNOSIS — D50.0 IRON DEFICIENCY ANEMIA DUE TO CHRONIC BLOOD LOSS: ICD-10-CM

## 2023-05-22 DIAGNOSIS — E78.5 HYPERLIPIDEMIA, UNSPECIFIED HYPERLIPIDEMIA TYPE: ICD-10-CM

## 2023-05-22 PROCEDURE — 99214 OFFICE O/P EST MOD 30 MIN: CPT | Mod: S$PBB,,, | Performed by: STUDENT IN AN ORGANIZED HEALTH CARE EDUCATION/TRAINING PROGRAM

## 2023-05-22 PROCEDURE — 3079F PR MOST RECENT DIASTOLIC BLOOD PRESSURE 80-89 MM HG: ICD-10-PCS | Mod: CPTII,,, | Performed by: STUDENT IN AN ORGANIZED HEALTH CARE EDUCATION/TRAINING PROGRAM

## 2023-05-22 PROCEDURE — 99999 PR PBB SHADOW E&M-EST. PATIENT-LVL III: CPT | Mod: PBBFAC,,, | Performed by: STUDENT IN AN ORGANIZED HEALTH CARE EDUCATION/TRAINING PROGRAM

## 2023-05-22 PROCEDURE — 3079F DIAST BP 80-89 MM HG: CPT | Mod: CPTII,,, | Performed by: STUDENT IN AN ORGANIZED HEALTH CARE EDUCATION/TRAINING PROGRAM

## 2023-05-22 PROCEDURE — 99213 OFFICE O/P EST LOW 20 MIN: CPT | Mod: PBBFAC | Performed by: STUDENT IN AN ORGANIZED HEALTH CARE EDUCATION/TRAINING PROGRAM

## 2023-05-22 PROCEDURE — 3074F PR MOST RECENT SYSTOLIC BLOOD PRESSURE < 130 MM HG: ICD-10-PCS | Mod: CPTII,,, | Performed by: STUDENT IN AN ORGANIZED HEALTH CARE EDUCATION/TRAINING PROGRAM

## 2023-05-22 PROCEDURE — 1159F MED LIST DOCD IN RCRD: CPT | Mod: CPTII,,, | Performed by: STUDENT IN AN ORGANIZED HEALTH CARE EDUCATION/TRAINING PROGRAM

## 2023-05-22 PROCEDURE — 3008F BODY MASS INDEX DOCD: CPT | Mod: CPTII,,, | Performed by: STUDENT IN AN ORGANIZED HEALTH CARE EDUCATION/TRAINING PROGRAM

## 2023-05-22 PROCEDURE — 1159F PR MEDICATION LIST DOCUMENTED IN MEDICAL RECORD: ICD-10-PCS | Mod: CPTII,,, | Performed by: STUDENT IN AN ORGANIZED HEALTH CARE EDUCATION/TRAINING PROGRAM

## 2023-05-22 PROCEDURE — 3074F SYST BP LT 130 MM HG: CPT | Mod: CPTII,,, | Performed by: STUDENT IN AN ORGANIZED HEALTH CARE EDUCATION/TRAINING PROGRAM

## 2023-05-22 PROCEDURE — 99999 PR PBB SHADOW E&M-EST. PATIENT-LVL III: ICD-10-PCS | Mod: PBBFAC,,, | Performed by: STUDENT IN AN ORGANIZED HEALTH CARE EDUCATION/TRAINING PROGRAM

## 2023-05-22 PROCEDURE — 3008F PR BODY MASS INDEX (BMI) DOCUMENTED: ICD-10-PCS | Mod: CPTII,,, | Performed by: STUDENT IN AN ORGANIZED HEALTH CARE EDUCATION/TRAINING PROGRAM

## 2023-05-22 PROCEDURE — 99214 PR OFFICE/OUTPT VISIT, EST, LEVL IV, 30-39 MIN: ICD-10-PCS | Mod: S$PBB,,, | Performed by: STUDENT IN AN ORGANIZED HEALTH CARE EDUCATION/TRAINING PROGRAM

## 2023-05-22 NOTE — PROGRESS NOTES
Hematology- Oncology Clinic Note :     2023            HPI      Pt is a 28 yo  F with pmhx of headaches (chronic), HTN, HLD, dysmenorrhea who presents as a referral to establish care for anemia.  Labs significant for iron def and pt has been experiencing ice cravings and worsening fatigue for the past couple of months.  Cycles tend to be heavy and cramping despite being on OCPs for past 3-4 years.      Interval hx:    Pt presents for follow up and symptoms resolved s/p IV iron.  Labs reviewed with pt and all questions answered to her satisfaction.          Active Problem List with Overview Notes    Diagnosis Date Noted    Iron deficiency anemia due to chronic blood loss 2023    Chronic nonintractable headache 2023    Hypertension, benign 2023    Pure hypercholesterolemia 2023     Lab Results   Component Value Date    CHOL 279 (H) 2023    CHOL 276 (H) 10/06/2022    CHOL 272 (H) 2022     Lab Results   Component Value Date    HDL 95 (H) 2023    HDL 89 (H) 10/06/2022    HDL 85 (H) 2022     Lab Results   Component Value Date    LDLCALC 170.2 (H) 2023    LDLCALC 163.2 (H) 10/06/2022    LDLCALC 168.8 (H) 2022     Lab Results   Component Value Date    TRIG 69 2023    TRIG 119 10/06/2022    TRIG 91 2022         Dysmenorrhea 2023       Patient Active Problem List    Diagnosis Date Noted    Iron deficiency anemia due to chronic blood loss 2023    Chronic nonintractable headache 2023    Hypertension, benign 2023    Pure hypercholesterolemia 2023    Dysmenorrhea 2023     No past medical history on file.   Past Surgical History:   Procedure Laterality Date    APPENDECTOMY      BREAST SURGERY      KIDNEY SURGERY      Blocked tube      (Not in a hospital admission)    Review of patient's allergies indicates:  No Known Allergies   Social History     Tobacco Use    Smoking status: Never    Smokeless tobacco: Never    Substance Use Topics    Alcohol use: No      Family History   Problem Relation Age of Onset    Breast cancer Neg Hx     Colon cancer Neg Hx     Ovarian cancer Neg Hx         Review of Systems :  Review of Systems   Constitutional:  Negative for fever and malaise/fatigue.   HENT:  Negative for congestion, hearing loss and nosebleeds.    Eyes:  Negative for blurred vision and pain.   Respiratory:  Negative for cough and shortness of breath.    Cardiovascular:  Negative for chest pain, palpitations, claudication and leg swelling.   Gastrointestinal:  Negative for abdominal pain, blood in stool, constipation, diarrhea and heartburn.   Genitourinary:  Negative for dysuria and hematuria.   Musculoskeletal:  Negative for joint pain and myalgias.   Skin:  Negative for itching and rash.   Neurological:  Positive for headaches (chronic). Negative for dizziness and weakness.   Endo/Heme/Allergies:         Heavy periods   Psychiatric/Behavioral:  Negative for depression. The patient is not nervous/anxious.      Physical Exam :  Wt Readings from Last 3 Encounters:   05/22/23 74.3 kg (163 lb 12.8 oz)   05/05/23 76.4 kg (168 lb 6.9 oz)   04/27/23 76.4 kg (168 lb 6.9 oz)     Temp Readings from Last 3 Encounters:   04/27/23 98.5 °F (36.9 °C) (Oral)   04/10/23 98.6 °F (37 °C) (Oral)   04/04/23 97 °F (36.1 °C)     BP Readings from Last 3 Encounters:   05/22/23 125/82   05/05/23 118/80   05/05/23 111/71     Pulse Readings from Last 3 Encounters:   05/22/23 83   05/05/23 81   04/27/23 93     Body mass index is 30.95 kg/m².    Physical Exam  Vitals reviewed.   HENT:      Head: Normocephalic.      Nose: Nose normal.      Mouth/Throat:      Mouth: Mucous membranes are moist.   Eyes:      Pupils: Pupils are equal, round, and reactive to light.   Cardiovascular:      Heart sounds: Normal heart sounds.   Pulmonary:      Effort: Pulmonary effort is normal.      Breath sounds: Normal breath sounds.   Abdominal:      General: Abdomen is flat.    Musculoskeletal:         General: Normal range of motion.      Cervical back: Normal range of motion and neck supple.   Skin:     General: Skin is warm and dry.   Neurological:      Mental Status: She is alert and oriented to person, place, and time.   Psychiatric:         Mood and Affect: Mood normal.         Behavior: Behavior normal.         Thought Content: Thought content normal.         Judgment: Judgment normal.         Pertinent Diagnostic studies:    No results found for this or any previous visit (from the past 24 hour(s)).    Assessment/Plan :     Anemia, iron deficiency  -Labs significant for iron def anemia  -Most likely from heavy menses  -Pt denies blood in urine or stool  -s/p IV iron with resolution of symptoms and normalization of labs  -RTC in 3 months with repeat labs week before      HTN  -Stable  -Per PCP      HLD  -Diet and exercise advised  -Per PCP      Dysmenorrhea  -Pt complains of painful heavy cycles for past 3-4 years  -On OCPs      Hx of chronic headaches  -Stable  -Has follow up with neurology      Time spent on Case: 30 minutes      Summary of orders placed this encounter:  No orders of the defined types were placed in this encounter.      Future Appointments   Date Time Provider Department Center   5/22/2023  3:30 PM Camelia Khalil MD [facility] HEM ONC [facility] [place] [facility]   10/27/2023  8:00 AM LAB, [facility] DRAW STATION [facility] LAB [facility]   11/2/2023  2:20 PM Rodger Conteh Jr., MD [facility]           Camelia Khalil MD   Hematology/oncology, [facility] [place]

## 2023-05-23 LAB — METHYLMALONATE SERPL-SCNC: 0.19 UMOL/L

## 2023-05-25 LAB — COPPER SERPL-MCNC: 942 UG/L (ref 810–1990)

## 2023-05-28 DIAGNOSIS — E78.00 PURE HYPERCHOLESTEROLEMIA: Chronic | ICD-10-CM

## 2023-05-29 RX ORDER — ATORVASTATIN CALCIUM 20 MG/1
20 TABLET, FILM COATED ORAL DAILY
Qty: 90 TABLET | Refills: 3 | Status: SHIPPED | OUTPATIENT
Start: 2023-05-29 | End: 2023-06-30 | Stop reason: ALTCHOICE

## 2023-05-29 NOTE — TELEPHONE ENCOUNTER
Refill Decision Note   Bailey Juan J  is requesting a refill authorization.  Brief Assessment and Rationale for Refill:  Approve     Medication Therapy Plan:       Medication Reconciliation Completed: No   Comments:     No Care Gaps recommended.     Note composed:12:12 PM 05/29/2023

## 2023-05-29 NOTE — TELEPHONE ENCOUNTER
No care due was identified.  [facility] Embedded Care Due Messages. Reference number: [id number].   5/28/2023 9:15:52 PM CDT

## 2023-06-04 DIAGNOSIS — I10 HYPERTENSION, BENIGN: Chronic | ICD-10-CM

## 2023-06-05 DIAGNOSIS — I10 HYPERTENSION, BENIGN: Chronic | ICD-10-CM

## 2023-06-05 NOTE — TELEPHONE ENCOUNTER
No care due was identified.  [facility] Embedded Care Due Messages. Reference number: [id number].   6/04/2023 7:06:32 PM CDT

## 2023-06-05 NOTE — TELEPHONE ENCOUNTER
Refill Routing Note   Medication(s) are not appropriate for processing by [facility] for the following reason(s):      New or recently adjusted medication    [facility] action(s):  Defer None identified          Appointments  past 12m or future 3m with PCP    Date Provider   Last Visit   4/27/2023 Rodger Cnoteh Jr., MD   Next Visit   11/2/2023 Rodger Conteh Jr., MD   ED visits in past 90 days: 0        Note composed:2:49 PM 06/05/2023

## 2023-06-06 RX ORDER — LABETALOL 100 MG/1
100 TABLET, FILM COATED ORAL 2 TIMES DAILY
Qty: 180 TABLET | Refills: 3 | Status: SHIPPED | OUTPATIENT
Start: 2023-06-06 | End: 2023-11-02 | Stop reason: SDUPTHER

## 2023-06-06 RX ORDER — LABETALOL 100 MG/1
100 TABLET, FILM COATED ORAL 2 TIMES DAILY
Qty: 60 TABLET | Refills: 0 | OUTPATIENT
Start: 2023-06-06 | End: 2024-06-05

## 2023-06-06 NOTE — TELEPHONE ENCOUNTER
No care due was identified.  [facility] Embedded Care Due Messages. Reference number: [id number].   6/05/2023 10:47:37 PM CDT

## 2023-06-06 NOTE — TELEPHONE ENCOUNTER
Refill Routing Note   Medication(s) are not appropriate for processing by [facility] for the following reason(s):      New or recently adjusted medication    [facility] action(s):  Defer None identified          Appointments  past 12m or future 3m with PCP    Date Provider   Last Visit   4/27/2023 Rodger Conteh Jr., MD   Next Visit   11/2/2023 Rodger Conteh Jr., MD   ED visits in past 90 days: 0        Note composed:1:34 PM 06/06/2023

## 2023-06-27 ENCOUNTER — PATIENT MESSAGE (OUTPATIENT)
Dept: FAMILY MEDICINE | Facility: CLINIC | Age: 28
End: 2023-06-27
Payer: MEDICAID

## 2023-06-28 ENCOUNTER — HOSPITAL ENCOUNTER (EMERGENCY)
Facility: HOSPITAL | Age: 28
Discharge: HOME | End: 2023-06-28
Attending: INTERNAL MEDICINE
Payer: MEDICAID

## 2023-06-28 ENCOUNTER — TELEPHONE (OUTPATIENT)
Dept: FAMILY MEDICINE | Facility: CLINIC | Age: 28
End: 2023-06-28
Payer: MEDICAID

## 2023-06-28 VITALS
SYSTOLIC BLOOD PRESSURE: 146 MMHG | RESPIRATION RATE: 18 BRPM | WEIGHT: 171 LBS | DIASTOLIC BLOOD PRESSURE: 94 MMHG | TEMPERATURE: 99 F | OXYGEN SATURATION: 100 % | HEART RATE: 87 BPM | BODY MASS INDEX: 32.31 KG/M2

## 2023-06-28 DIAGNOSIS — M79.604 PAIN IN BOTH LOWER EXTREMITIES: Primary | ICD-10-CM

## 2023-06-28 DIAGNOSIS — S86.919A MUSCLE STRAIN OF LOWER EXTREMITY, INITIAL ENCOUNTER: ICD-10-CM

## 2023-06-28 DIAGNOSIS — M79.605 PAIN IN BOTH LOWER EXTREMITIES: Primary | ICD-10-CM

## 2023-06-28 LAB
B-HCG UR QL: NEGATIVE
CTP QC/QA: YES

## 2023-06-28 PROCEDURE — 81025 URINE PREGNANCY TEST: CPT | Mod: ER

## 2023-06-28 PROCEDURE — 99284 EMERGENCY DEPT VISIT MOD MDM: CPT | Mod: ER

## 2023-06-28 RX ORDER — NAPROXEN 500 MG/1
500 TABLET ORAL 2 TIMES DAILY PRN
Qty: 30 TABLET | Refills: 0 | Status: SHIPPED | OUTPATIENT
Start: 2023-06-28 | End: 2023-11-02 | Stop reason: ALTCHOICE

## 2023-06-28 RX ORDER — METHOCARBAMOL 500 MG/1
1000 TABLET, FILM COATED ORAL 3 TIMES DAILY PRN
Qty: 30 TABLET | Refills: 0 | Status: SHIPPED | OUTPATIENT
Start: 2023-06-28 | End: 2023-07-28 | Stop reason: SDUPTHER

## 2023-06-28 RX ORDER — LIDOCAINE 50 MG/G
1 PATCH TOPICAL DAILY
Qty: 15 PATCH | Refills: 0 | Status: SHIPPED | OUTPATIENT
Start: 2023-06-28 | End: 2023-11-02

## 2023-06-28 RX ORDER — DICLOFENAC SODIUM 10 MG/G
2 GEL TOPICAL 4 TIMES DAILY PRN
Qty: 50 G | Refills: 0 | Status: SHIPPED | OUTPATIENT
Start: 2023-06-28

## 2023-06-28 NOTE — ED PROVIDER NOTES
Encounter Date: 6/28/2023    SCRIBE #1 NOTE: I, Cathryn Garcia, am scribing for, and in the presence of,  Alayna Holdsworth, PA. I have scribed the following portions of the note - Other sections scribed: HPI, ROS.     History     Chief Complaint   Patient presents with    Leg Pain     Bailey opal Restrepo 27 y.o. female presents to the ED via PV with CC of bilateral leg pain and tingling onset last Thursday. Pt is on birth control, denies pmhx of blood clots.         27 y.o. female with HTN and HLD who presents to the ED for chief complaint of 5/10 aching bilateral lower leg pain intermittent for 6 days. She took Excedrin at time of onset with relief, nothing since. She reports associated tingling. Denies any numbness, swelling, hemoptysis, SOB, color change, or rash. Denies known trauma. Patient is on birth control. Patient denies any long car rides, long airplane rides, recent surgery, hemoptysis, calf swelling, and history of DVTs.        The history is provided by the patient. No  was used.   Review of patient's allergies indicates:  No Known Allergies  No past medical history on file.  Past Surgical History:   Procedure Laterality Date    APPENDECTOMY      BREAST SURGERY      KIDNEY SURGERY      Blocked tube     Family History   Problem Relation Age of Onset    Breast cancer Neg Hx     Colon cancer Neg Hx     Ovarian cancer Neg Hx      Social History     Tobacco Use    Smoking status: Never    Smokeless tobacco: Never   Substance Use Topics    Alcohol use: No    Drug use: No     Review of Systems   Constitutional:  Negative for chills, diaphoresis and fever.   Respiratory:  Negative for cough (hemoptysis) and shortness of breath.    Cardiovascular:  Negative for chest pain and leg swelling.   Gastrointestinal:  Negative for abdominal pain, constipation, diarrhea, nausea and vomiting.   Genitourinary:  Negative for decreased urine volume, difficulty urinating, dysuria and urgency.    Musculoskeletal:  Positive for myalgias (BLE). Negative for back pain and joint swelling.   Skin:  Negative for color change, pallor, rash and wound.   Neurological:  Negative for dizziness, weakness, light-headedness, numbness and headaches.        (+) Tingling     Physical Exam     Initial Vitals [06/28/23 1403]   BP Pulse Resp Temp SpO2   (!) 146/94 87 18 98.7 °F (37.1 °C) 100 %      MAP       --         Physical Exam    Nursing note and vitals reviewed.  Constitutional: She appears well-developed and well-nourished. She is not diaphoretic. She is active. She does not appear ill. No distress.   HENT:   Head: Normocephalic and atraumatic.   Right Ear: External ear normal.   Left Ear: External ear normal.   Nose: Nose normal.   Eyes: Conjunctivae, EOM and lids are normal. Pupils are equal, round, and reactive to light. Right eye exhibits no discharge. Left eye exhibits no discharge.   Neck: Neck supple.   Normal range of motion.   Full passive range of motion without pain.     Cardiovascular:  Normal rate and regular rhythm.           Pulses:       Dorsalis pedis pulses are 2+ on the right side and 2+ on the left side.   Pulmonary/Chest: Effort normal and breath sounds normal. No respiratory distress.   Abdominal: She exhibits no distension.   Musculoskeletal:         General: Normal range of motion.      Cervical back: Full passive range of motion without pain, normal range of motion and neck supple.      Right knee: Normal.      Left knee: Normal.      Right lower leg: Tenderness (posterior distal calf) present. No swelling, deformity, lacerations or bony tenderness. No edema.      Left lower leg: Tenderness (posterior distal calf) present. No swelling, deformity, lacerations or bony tenderness. No edema.      Right ankle: Normal.      Left ankle: Normal.      Right foot: Normal.      Left foot: Normal.        Legs:       Comments: No pallor, erythema, edema, swelling, warmth, rashes, or deformities  appreciated. 2+ DP pulses. Normal sensation. Normal ROM.      Neurological: She is alert and oriented to person, place, and time. She has normal strength. No sensory deficit. GCS eye subscore is 4. GCS verbal subscore is 5. GCS motor subscore is 6.   Skin: Skin is dry. Capillary refill takes less than 2 seconds. No ecchymosis and no rash noted.       ED Course   Procedures  Labs Reviewed   POCT URINE PREGNANCY          Imaging Results    None          Medications - No data to display  Medical Decision Making:   History:   Old Medical Records: I decided to obtain old medical records.  Initial Assessment:   27 y.o. female with HTN and HLD who presents to the ED for chief complaint of 5/10 aching bilateral lower leg pain.  Patient's chart and medical history reviewed.  Differential Diagnosis:   Fracture  Dislocation  Contusion  Sprain  Strain  DVT  Clinical Tests:   Lab Tests: Reviewed and Ordered  ED Management:  Patient's vitals reviewed.  She is afebrile, no respiratory distress, nontoxic-appearing in the ED. Patient tenderness palpation of bilateral posterior distal calves. No pallor, erythema, edema, swelling, warmth, rashes, or deformities appreciated. 2+ DP pulses. Normal sensation. Normal ROM.  Patient denied pain medication. UPT negative. Discussed with patient this is most likely an acute muscle strain.  Considered but unlikely DVT.  Discussed with patient to rest, ice, heat, and elevated.  Patient be sent home on Robaxin, naproxen, Voltaren gel, and lidocaine patches for symptomatic control. Discussed with her that she must leave patch on for 12 hours then leave off for 12 hours, she verbalized understanding.  Patient follow-up with PCP. Patient agrees with this plan. Discussed with her strict return precautions, she verbalized understanding. Patient is stable for discharge.         Scribe Attestation:   Scribe #1: I performed the above scribed service and the documentation accurately describes the services  I performed. I attest to the accuracy of the note.                 I, Alayna Holdsworth,PA-C, personally performed the services described in this documentation.  All medical record entries made by the scribe were at my direction and in my presence.  I have reviewed the chart and agree that the record reflects my personal performance and is accurate and complete.  Clinical Impression:   Final diagnoses:  [M79.604, M79.605] Pain in both lower extremities (Primary)  [S86.407Z] Muscle strain of lower extremity, initial encounter        ED Disposition Condition    Discharge Stable          ED Prescriptions       Medication Sig Dispense Start Date End Date Auth. Provider    methocarbamoL (ROBAXIN) 500 MG Tab Take 2 tablets (1,000 mg total) by mouth 3 (three) times daily as needed (Pain). 30 tablet 6/28/2023 -- Alayna Holdsworth, PA-C    naproxen (NAPROSYN) 500 MG tablet Take 1 tablet (500 mg total) by mouth 2 (two) times daily as needed (Pain). 30 tablet 6/28/2023 -- Alayna Holdsworth, PA-C    diclofenac sodium (VOLTAREN) 1 % Gel Apply 2 g topically 4 (four) times daily as needed. 50 g 6/28/2023 -- Alayna Holdsworth, PA-C    LIDOcaine (LIDODERM) 5 % Place 1 patch onto the skin once daily. Remove & Discard patch within 12 hours then leave off for 12 hours 15 patch 6/28/2023 -- Alayna Holdsworth, PA-C          Follow-up Information       Follow up With Specialties Details Why Contact Info    Rodger Conteh Jr., MD Family Medicine   [address] [place] [id number]  [phone]               Alayna Holdsworth, PA-C  06/28/23 1885

## 2023-06-28 NOTE — DISCHARGE INSTRUCTIONS

## 2023-06-28 NOTE — TELEPHONE ENCOUNTER
----- Message from Evelia Russ MA sent at 6/28/2023 10:35 AM CDT -----  Type:  Sooner Apoointment Request    Caller is requesting a sooner appointment. yes   Name of Caller: pt  When is the first available appointment? N/a  Symptoms: leg pain  Would the patient rather a call back or a response via [facility]? Call back  Best Call Back Number: [phone]  Additional Information:  pt requesting a call back to be scheduled

## 2023-06-30 ENCOUNTER — OFFICE VISIT (OUTPATIENT)
Dept: FAMILY MEDICINE | Facility: CLINIC | Age: 28
End: 2023-06-30
Payer: MEDICAID

## 2023-06-30 VITALS
OXYGEN SATURATION: 98 % | BODY MASS INDEX: 32.55 KG/M2 | DIASTOLIC BLOOD PRESSURE: 82 MMHG | TEMPERATURE: 98 F | HEIGHT: 61 IN | WEIGHT: 172.38 LBS | HEART RATE: 84 BPM | SYSTOLIC BLOOD PRESSURE: 124 MMHG

## 2023-06-30 DIAGNOSIS — M79.605 BILATERAL LEG PAIN: ICD-10-CM

## 2023-06-30 DIAGNOSIS — E78.00 PURE HYPERCHOLESTEROLEMIA: Primary | ICD-10-CM

## 2023-06-30 DIAGNOSIS — I10 HYPERTENSION, BENIGN: ICD-10-CM

## 2023-06-30 DIAGNOSIS — M79.604 BILATERAL LEG PAIN: ICD-10-CM

## 2023-06-30 DIAGNOSIS — M79.10 MYALGIA: ICD-10-CM

## 2023-06-30 PROCEDURE — 1159F MED LIST DOCD IN RCRD: CPT | Mod: CPTII,,, | Performed by: NURSE PRACTITIONER

## 2023-06-30 PROCEDURE — 99214 PR OFFICE/OUTPT VISIT, EST, LEVL IV, 30-39 MIN: ICD-10-PCS | Mod: S$PBB,,, | Performed by: NURSE PRACTITIONER

## 2023-06-30 PROCEDURE — 1159F PR MEDICATION LIST DOCUMENTED IN MEDICAL RECORD: ICD-10-PCS | Mod: CPTII,,, | Performed by: NURSE PRACTITIONER

## 2023-06-30 PROCEDURE — 3074F PR MOST RECENT SYSTOLIC BLOOD PRESSURE < 130 MM HG: ICD-10-PCS | Mod: CPTII,,, | Performed by: NURSE PRACTITIONER

## 2023-06-30 PROCEDURE — 3074F SYST BP LT 130 MM HG: CPT | Mod: CPTII,,, | Performed by: NURSE PRACTITIONER

## 2023-06-30 PROCEDURE — 3079F PR MOST RECENT DIASTOLIC BLOOD PRESSURE 80-89 MM HG: ICD-10-PCS | Mod: CPTII,,, | Performed by: NURSE PRACTITIONER

## 2023-06-30 PROCEDURE — 99214 OFFICE O/P EST MOD 30 MIN: CPT | Mod: S$PBB,,, | Performed by: NURSE PRACTITIONER

## 2023-06-30 PROCEDURE — 99999 PR PBB SHADOW E&M-EST. PATIENT-LVL IV: ICD-10-PCS | Mod: PBBFAC,,, | Performed by: NURSE PRACTITIONER

## 2023-06-30 PROCEDURE — 99214 OFFICE O/P EST MOD 30 MIN: CPT | Mod: PBBFAC,PN | Performed by: NURSE PRACTITIONER

## 2023-06-30 PROCEDURE — 1160F PR REVIEW ALL MEDS BY PRESCRIBER/CLIN PHARMACIST DOCUMENTED: ICD-10-PCS | Mod: CPTII,,, | Performed by: NURSE PRACTITIONER

## 2023-06-30 PROCEDURE — 3008F PR BODY MASS INDEX (BMI) DOCUMENTED: ICD-10-PCS | Mod: CPTII,,, | Performed by: NURSE PRACTITIONER

## 2023-06-30 PROCEDURE — 3079F DIAST BP 80-89 MM HG: CPT | Mod: CPTII,,, | Performed by: NURSE PRACTITIONER

## 2023-06-30 PROCEDURE — 1160F RVW MEDS BY RX/DR IN RCRD: CPT | Mod: CPTII,,, | Performed by: NURSE PRACTITIONER

## 2023-06-30 PROCEDURE — 3008F BODY MASS INDEX DOCD: CPT | Mod: CPTII,,, | Performed by: NURSE PRACTITIONER

## 2023-06-30 PROCEDURE — 99999 PR PBB SHADOW E&M-EST. PATIENT-LVL IV: CPT | Mod: PBBFAC,,, | Performed by: NURSE PRACTITIONER

## 2023-06-30 RX ORDER — PRAVASTATIN SODIUM 20 MG/1
20 TABLET ORAL DAILY
Qty: 90 TABLET | Refills: 3 | Status: SHIPPED | OUTPATIENT
Start: 2023-06-30 | End: 2023-07-06 | Stop reason: RX

## 2023-06-30 NOTE — PROGRESS NOTES
Chief Complaint  Chief Complaint   Patient presents with    Leg Pain     Both, tingling, went to ER on Wednesday, pain scale 8       HPI    HPI   Ms. Bailey Arita is a 27 y.o. female with medical problems as listed below. The patient presents to clinic for ED follow up:    ED course as follows:   Leg Pain       Bailey Arita, a 27 y.o. female presents to the ED via PV with CC of bilateral leg pain and tingling onset last Thursday. Pt is on birth control, denies pmhx of blood clots.            27 y.o. female with HTN and HLD who presents to the ED for chief complaint of 5/10 aching bilateral lower leg pain intermittent for 6 days. She took Excedrin at time of onset with relief, nothing since. She reports associated tingling. Denies any numbness, swelling, hemoptysis, SOB, color change, or rash. Denies known trauma. Patient is on birth control. Patient denies any long car rides, long airplane rides, recent surgery, hemoptysis, calf swelling, and history of DVTs.    Medications - No data to display  Medical Decision Making:   History:   Old Medical Records: I decided to obtain old medical records.  Initial Assessment:   27 y.o. female with HTN and HLD who presents to the ED for chief complaint of 5/10 aching bilateral lower leg pain.  Patient's chart and medical history reviewed.  Differential Diagnosis:   Fracture  Dislocation  Contusion  Sprain  Strain  DVT  Clinical Tests:   Lab Tests: Reviewed and Ordered  ED Management:  Patient's vitals reviewed.  She is afebrile, no respiratory distress, nontoxic-appearing in the ED. Patient tenderness palpation of bilateral posterior distal calves. No pallor, erythema, edema, swelling, warmth, rashes, or deformities appreciated. 2+ DP pulses. Normal sensation. Normal ROM.  Patient denied pain medication. UPT negative. Discussed with patient this is most likely an acute muscle strain.  Considered but unlikely DVT.  Discussed with patient to rest, ice, heat, and elevated.   Patient be sent home on Robaxin, naproxen, Voltaren gel, and lidocaine patches for symptomatic control. Discussed with her that she must leave patch on for 12 hours then leave off for 12 hours, she verbalized understanding.  Patient follow-up with PCP. Patient agrees with this plan. Discussed with her strict return precautions, she verbalized understanding. Patient is stable for discharge.         Scribe Attestation:   Scribe #1: I performed the above scribed service and the documentation accurately describes the services I performed. I attest to the accuracy of the note.            I, Alayna Holdsworth,PA-C, personally performed the services described in this documentation.  All medical record entries made by the scribe were at my direction and in my presence.  I have reviewed the chart and agree that the record reflects my personal performance and is accurate and complete.  Clinical Impression:   Final diagnoses:  [M79.604, M79.605] Pain in both lower extremities (Primary)  [S86.349A] Muscle strain of lower extremity, initial encounter     Since the visit:  She states that she has been taking her medication as prescribed. She has some relief with the pain but still has the tingling in both legs. She denies any swelling or redness to either area. She denies any CP or SOB. Also denies any back pain, knee pain or hip pain.     Of note she has been recently started on a statin.     She has no other complaints at this time.    PAST MEDICAL HISTORY:  History reviewed. No pertinent past medical history.    PAST SURGICAL HISTORY:  Past Surgical History:   Procedure Laterality Date    APPENDECTOMY      BREAST SURGERY      KIDNEY SURGERY      Blocked tube       SOCIAL HISTORY:  Social History     Socioeconomic History    Marital status: Single   Tobacco Use    Smoking status: Never    Smokeless tobacco: Never   Substance and Sexual Activity    Alcohol use: No    Drug use: No    Sexual activity: Never     Social Determinants  of Health     Financial Resource Strain: Low Risk     Difficulty of Paying Living Expenses: Not hard at all   Food Insecurity: No Food Insecurity    Worried About Running Out of Food in the Last Year: Never true    Ran Out of Food in the Last Year: Never true   Transportation Needs: No Transportation Needs    Lack of Transportation (Medical): No    Lack of Transportation (Non-Medical): No   Physical Activity: Unknown    Days of Exercise per Week: 0 days   Stress: No Stress Concern Present    Feeling of Stress : Not at all   Social Connections: Unknown    Frequency of Communication with Friends and Family: Patient refused    Frequency of Social Gatherings with Friends and Family: Patient refused    Active Member of Clubs or Organizations: Patient refused    Attends Club or Organization Meetings: Patient refused    Marital Status: Never    Housing Stability: Low Risk     Unable to Pay for Housing in the Last Year: No    Number of Places Lived in the Last Year: 1    Unstable Housing in the Last Year: No       FAMILY HISTORY:  Family History   Problem Relation Age of Onset    Breast cancer Neg Hx     Colon cancer Neg Hx     Ovarian cancer Neg Hx        ALLERGIES AND MEDICATIONS: updated and reviewed.  Review of patient's allergies indicates:  No Known Allergies  Current Outpatient Medications   Medication Sig Dispense Refill    amLODIPine (NORVASC) 10 MG tablet Take 1 tablet (10 mg total) by mouth once daily. 90 tablet 3    diclofenac sodium (VOLTAREN) 1 % Gel Apply 2 g topically 4 (four) times daily as needed. 50 g 0    labetaloL (NORMODYNE) 100 MG tablet Take 1 tablet (100 mg total) by mouth 2 (two) times daily. 180 tablet 3    levonorgestreL (RONAK) 14 mcg/24 hrs (3 yrs) 13.5 mg IUD 1 Intra Uterine Device by Intrauterine route. Placed by Dr. Christiane Rosado MD on 4/14/23      LIDOcaine (LIDODERM) 5 % Place 1 patch onto the skin once daily. Remove & Discard patch within 12 hours then leave off for 12 hours 15  patch 0    methocarbamoL (ROBAXIN) 500 MG Tab Take 2 tablets (1,000 mg total) by mouth 3 (three) times daily as needed (Pain). 30 tablet 0    naproxen (NAPROSYN) 500 MG tablet Take 1 tablet (500 mg total) by mouth 2 (two) times daily as needed (Pain). 30 tablet 0    pravastatin (PRAVACHOL) 20 MG tablet Take 1 tablet (20 mg total) by mouth once daily. 90 tablet 3    rizatriptan (MAXALT-MLT) 10 MG disintegrating tablet Take 1 tablet (10 mg total) by mouth as needed for Migraine. May repeat in 2 hours if needed. Max 20mg/24hrs. 12 tablet 11     No current facility-administered medications for this visit.       Patient Care Team:  Rodger Conteh Jr., MD as PCP - General (Family Medicine)  Wenceslao Fry MA as Care Coordinator  Christiane Hernandez MD (Obstetrics and Gynecology)  Angelica Birch as Digital Medicine Health   Rodger Conteh Jr., MD as Hypertension Digital Medicine Responsible Provider (Family Medicine)  Catherine Knight PharmD as Hypertension Digital Medicine Clinician  [employer] as Hypertension Digital Medicine Contract    ROS  Review of Systems   Constitutional:  Negative for chills, fatigue, fever and unexpected weight change.   HENT:  Negative for congestion, ear discharge, ear pain and postnasal drip.    Eyes:  Negative for photophobia, pain and visual disturbance.   Respiratory:  Negative for cough, shortness of breath and wheezing.    Cardiovascular:  Negative for chest pain, palpitations and leg swelling.   Gastrointestinal:  Negative for abdominal pain, constipation, diarrhea, nausea and vomiting.   Genitourinary:  Negative for dysuria, frequency, urgency and vaginal discharge.   Musculoskeletal:  Positive for myalgias. Negative for back pain, joint swelling and neck stiffness.   Skin:  Negative for rash.   Neurological:  Negative for weakness and headaches.   Psychiatric/Behavioral:  Negative for dysphoric mood and sleep disturbance. The patient is not nervous/anxious.          Physical Exam  Vitals:     "06/30/23 0802   BP: 124/82   BP Location: Left arm   Patient Position: Sitting   BP Method: Medium (Manual)   Pulse: 84   Temp: 98.4 °F (36.9 °C)   TempSrc: Oral   SpO2: 98%   Weight: 78.2 kg (172 lb 6.4 oz)   Height: 5' 1" (1.549 m)    Body mass index is 32.57 kg/m².  Weight: 78.2 kg (172 lb 6.4 oz)   Height: 5' 1" (154.9 cm)     Physical Exam  Constitutional:       Appearance: She is well-developed.   HENT:      Head: Normocephalic and atraumatic.      Right Ear: External ear normal.      Left Ear: External ear normal.      Nose: Nose normal.   Eyes:      Extraocular Movements: Extraocular movements intact.   Cardiovascular:      Rate and Rhythm: Normal rate.   Pulmonary:      Effort: Pulmonary effort is normal.   Musculoskeletal:         General: Normal range of motion.      Cervical back: Normal range of motion.   Skin:     General: Skin is warm and dry.   Neurological:      Mental Status: She is alert and oriented to person, place, and time.   Psychiatric:         Behavior: Behavior normal.           Health Maintenance         Date Due Completion Date    HPV Vaccines (1 - 2-dose series) Never done ---    TETANUS VACCINE 02/07/2018 2/7/2008    COVID-19 Vaccine (4 - Pfizer series) 01/27/2022 12/2/2021    Pap Smear 03/24/2025 3/24/2022          Health maintenance reviewed at this time.    Assessment & Plan  Pure hypercholesterolemia  -     pravastatin (PRAVACHOL) 20 MG tablet; Take 1 tablet (20 mg total) by mouth once daily.  Dispense: 90 tablet; Refill: 3    Hypertension, benign  -     Comprehensive Metabolic Panel; Future; Expected date: 06/30/2023  The current medical regimen is effective;  continue present plan and medications.    Bilateral leg pain    Myalgia    Continue the medications that were prescribed by ED. Will switch from atorvastatin to pravastatin to see if this helps with the myalgias. Will check CMP and also have the patient follow up in about a month for further evaluation.    Patient agrees " with this plan.         Follow-up: Follow up in about 1 month (around 7/30/2023) for follow up visit..

## 2023-07-06 DIAGNOSIS — E78.00 PURE HYPERCHOLESTEROLEMIA: Primary | ICD-10-CM

## 2023-07-06 RX ORDER — SIMVASTATIN 20 MG/1
20 TABLET, FILM COATED ORAL NIGHTLY
Qty: 90 TABLET | Refills: 3 | Status: SHIPPED | OUTPATIENT
Start: 2023-07-06 | End: 2023-07-28 | Stop reason: ALTCHOICE

## 2023-07-28 ENCOUNTER — LAB VISIT (OUTPATIENT)
Dept: LAB | Facility: HOSPITAL | Age: 28
End: 2023-07-28
Attending: NURSE PRACTITIONER
Payer: MEDICAID

## 2023-07-28 ENCOUNTER — OFFICE VISIT (OUTPATIENT)
Dept: FAMILY MEDICINE | Facility: CLINIC | Age: 28
End: 2023-07-28
Payer: MEDICAID

## 2023-07-28 VITALS
TEMPERATURE: 99 F | WEIGHT: 175.94 LBS | OXYGEN SATURATION: 99 % | BODY MASS INDEX: 33.22 KG/M2 | SYSTOLIC BLOOD PRESSURE: 118 MMHG | HEIGHT: 61 IN | HEART RATE: 79 BPM | DIASTOLIC BLOOD PRESSURE: 76 MMHG

## 2023-07-28 DIAGNOSIS — M54.40 BILATERAL LOW BACK PAIN WITH SCIATICA, SCIATICA LATERALITY UNSPECIFIED, UNSPECIFIED CHRONICITY: ICD-10-CM

## 2023-07-28 DIAGNOSIS — E78.00 PURE HYPERCHOLESTEROLEMIA: Primary | ICD-10-CM

## 2023-07-28 DIAGNOSIS — M79.606 PAIN OF LOWER EXTREMITY, UNSPECIFIED LATERALITY: ICD-10-CM

## 2023-07-28 DIAGNOSIS — I10 HYPERTENSION, BENIGN: ICD-10-CM

## 2023-07-28 LAB
ALBUMIN SERPL BCP-MCNC: 4.4 G/DL (ref 3.5–5.2)
ALP SERPL-CCNC: 87 U/L (ref 55–135)
ALT SERPL W/O P-5'-P-CCNC: 25 U/L (ref 10–44)
ANION GAP SERPL CALC-SCNC: 9 MMOL/L (ref 8–16)
AST SERPL-CCNC: 27 U/L (ref 10–40)
BILIRUB SERPL-MCNC: 0.5 MG/DL (ref 0.1–1)
BUN SERPL-MCNC: 9 MG/DL (ref 6–20)
CALCIUM SERPL-MCNC: 10 MG/DL (ref 8.7–10.5)
CHLORIDE SERPL-SCNC: 108 MMOL/L (ref 95–110)
CO2 SERPL-SCNC: 25 MMOL/L (ref 23–29)
CREAT SERPL-MCNC: 1.1 MG/DL (ref 0.5–1.4)
EST. GFR  (NO RACE VARIABLE): >60 ML/MIN/1.73 M^2
GLUCOSE SERPL-MCNC: 102 MG/DL (ref 70–110)
POTASSIUM SERPL-SCNC: 4.8 MMOL/L (ref 3.5–5.1)
PROT SERPL-MCNC: 7.6 G/DL (ref 6–8.4)
SODIUM SERPL-SCNC: 142 MMOL/L (ref 136–145)

## 2023-07-28 PROCEDURE — 1160F PR REVIEW ALL MEDS BY PRESCRIBER/CLIN PHARMACIST DOCUMENTED: ICD-10-PCS | Mod: CPTII,,, | Performed by: NURSE PRACTITIONER

## 2023-07-28 PROCEDURE — 99999 PR PBB SHADOW E&M-EST. PATIENT-LVL IV: ICD-10-PCS | Mod: PBBFAC,,, | Performed by: NURSE PRACTITIONER

## 2023-07-28 PROCEDURE — 3078F PR MOST RECENT DIASTOLIC BLOOD PRESSURE < 80 MM HG: ICD-10-PCS | Mod: CPTII,,, | Performed by: NURSE PRACTITIONER

## 2023-07-28 PROCEDURE — 3078F DIAST BP <80 MM HG: CPT | Mod: CPTII,,, | Performed by: NURSE PRACTITIONER

## 2023-07-28 PROCEDURE — 99214 OFFICE O/P EST MOD 30 MIN: CPT | Mod: PBBFAC,PN | Performed by: NURSE PRACTITIONER

## 2023-07-28 PROCEDURE — 3008F PR BODY MASS INDEX (BMI) DOCUMENTED: ICD-10-PCS | Mod: CPTII,,, | Performed by: NURSE PRACTITIONER

## 2023-07-28 PROCEDURE — 99214 OFFICE O/P EST MOD 30 MIN: CPT | Mod: S$PBB,,, | Performed by: NURSE PRACTITIONER

## 2023-07-28 PROCEDURE — 3074F PR MOST RECENT SYSTOLIC BLOOD PRESSURE < 130 MM HG: ICD-10-PCS | Mod: CPTII,,, | Performed by: NURSE PRACTITIONER

## 2023-07-28 PROCEDURE — 1160F RVW MEDS BY RX/DR IN RCRD: CPT | Mod: CPTII,,, | Performed by: NURSE PRACTITIONER

## 2023-07-28 PROCEDURE — 80053 COMPREHEN METABOLIC PANEL: CPT | Performed by: NURSE PRACTITIONER

## 2023-07-28 PROCEDURE — 36415 COLL VENOUS BLD VENIPUNCTURE: CPT | Mod: PN | Performed by: NURSE PRACTITIONER

## 2023-07-28 PROCEDURE — 1159F MED LIST DOCD IN RCRD: CPT | Mod: CPTII,,, | Performed by: NURSE PRACTITIONER

## 2023-07-28 PROCEDURE — 99214 PR OFFICE/OUTPT VISIT, EST, LEVL IV, 30-39 MIN: ICD-10-PCS | Mod: S$PBB,,, | Performed by: NURSE PRACTITIONER

## 2023-07-28 PROCEDURE — 3008F BODY MASS INDEX DOCD: CPT | Mod: CPTII,,, | Performed by: NURSE PRACTITIONER

## 2023-07-28 PROCEDURE — 3074F SYST BP LT 130 MM HG: CPT | Mod: CPTII,,, | Performed by: NURSE PRACTITIONER

## 2023-07-28 PROCEDURE — 1159F PR MEDICATION LIST DOCUMENTED IN MEDICAL RECORD: ICD-10-PCS | Mod: CPTII,,, | Performed by: NURSE PRACTITIONER

## 2023-07-28 PROCEDURE — 99999 PR PBB SHADOW E&M-EST. PATIENT-LVL IV: CPT | Mod: PBBFAC,,, | Performed by: NURSE PRACTITIONER

## 2023-07-28 RX ORDER — METHOCARBAMOL 500 MG/1
1000 TABLET, FILM COATED ORAL 3 TIMES DAILY PRN
Qty: 30 TABLET | Refills: 0 | Status: SHIPPED | OUTPATIENT
Start: 2023-07-28 | End: 2023-11-02 | Stop reason: ALTCHOICE

## 2023-07-28 RX ORDER — PRAVASTATIN SODIUM 20 MG/1
20 TABLET ORAL DAILY
Qty: 90 TABLET | Refills: 3 | Status: SHIPPED | OUTPATIENT
Start: 2023-07-28 | End: 2023-11-02 | Stop reason: SDUPTHER

## 2023-07-28 NOTE — PROGRESS NOTES
Chief Complaint  Chief Complaint   Patient presents with    Follow-up       HPI    HPI   Ms. Bailey Artia is a 27 y.o. female with medical problems as listed below. The patient presents to clinic for follow up on bilateral leg pain and tingling. She was switched from atorvastatin to pravastatin to see if this would help with the muscle pain. Her insurance did not cover the pravastatin so simvastatin was sent in.    She states that when she was taking the pravastatin that she did not have the leg pain and tingling. When the simvastatin was sent in it took a while but she felt like the tingling came back. She would like to restart the pravastatin at this time.    She does note that she does have some lower back pain and that when she is on her feet for extended periods of time she notices that the pain can be worse.    PAST MEDICAL HISTORY:  History reviewed. No pertinent past medical history.    PAST SURGICAL HISTORY:  Past Surgical History:   Procedure Laterality Date    APPENDECTOMY      BREAST SURGERY      KIDNEY SURGERY      Blocked tube       SOCIAL HISTORY:  Social History     Socioeconomic History    Marital status: Single   Tobacco Use    Smoking status: Never    Smokeless tobacco: Never   Substance and Sexual Activity    Alcohol use: No    Drug use: No    Sexual activity: Never     Social Determinants of Health     Financial Resource Strain: Low Risk     Difficulty of Paying Living Expenses: Not hard at all   Food Insecurity: No Food Insecurity    Worried About Running Out of Food in the Last Year: Never true    Ran Out of Food in the Last Year: Never true   Transportation Needs: No Transportation Needs    Lack of Transportation (Medical): No    Lack of Transportation (Non-Medical): No   Physical Activity: Unknown    Days of Exercise per Week: 0 days   Stress: No Stress Concern Present    Feeling of Stress : Not at all   Social Connections: Unknown    Frequency of Communication with Friends and Family:  Patient refused    Frequency of Social Gatherings with Friends and Family: Patient refused    Active Member of Clubs or Organizations: Patient refused    Attends Club or Organization Meetings: Patient refused    Marital Status: Never    Housing Stability: Low Risk     Unable to Pay for Housing in the Last Year: No    Number of Places Lived in the Last Year: 1    Unstable Housing in the Last Year: No       FAMILY HISTORY:  Family History   Problem Relation Age of Onset    Breast cancer Neg Hx     Colon cancer Neg Hx     Ovarian cancer Neg Hx        ALLERGIES AND MEDICATIONS: updated and reviewed.  Review of patient's allergies indicates:  No Known Allergies  Current Outpatient Medications   Medication Sig Dispense Refill    amLODIPine (NORVASC) 10 MG tablet Take 1 tablet (10 mg total) by mouth once daily. 90 tablet 3    diclofenac sodium (VOLTAREN) 1 % Gel Apply 2 g topically 4 (four) times daily as needed. 50 g 0    labetaloL (NORMODYNE) 100 MG tablet Take 1 tablet (100 mg total) by mouth 2 (two) times daily. 180 tablet 3    levonorgestreL (RONAK) 14 mcg/24 hrs (3 yrs) 13.5 mg IUD 1 Intra Uterine Device by Intrauterine route. Placed by Dr. Christiane Rosado MD on 4/14/23      LIDOcaine (LIDODERM) 5 % Place 1 patch onto the skin once daily. Remove & Discard patch within 12 hours then leave off for 12 hours 15 patch 0    naproxen (NAPROSYN) 500 MG tablet Take 1 tablet (500 mg total) by mouth 2 (two) times daily as needed (Pain). 30 tablet 0    methocarbamoL (ROBAXIN) 500 MG Tab Take 2 tablets (1,000 mg total) by mouth 3 (three) times daily as needed (Pain). 30 tablet 0    pravastatin (PRAVACHOL) 20 MG tablet Take 1 tablet (20 mg total) by mouth once daily. 90 tablet 3    rizatriptan (MAXALT-MLT) 10 MG disintegrating tablet Take 1 tablet (10 mg total) by mouth as needed for Migraine. May repeat in 2 hours if needed. Max 20mg/24hrs. 12 tablet 11     No current facility-administered medications for this visit.  "      Patient Care Team:  Rodger Conteh Jr., MD as PCP - General (Family Medicine)  Wenceslao Fry MA as Care Coordinator  Christiane Hernandez MD (Obstetrics and Gynecology)  Angelica Birch as Digital Medicine Health   Rodger Conteh Jr., MD as Hypertension Digital Medicine Responsible Provider (Family Medicine)  Catherine Knight PharmD as Hypertension Digital Medicine Clinician  [employer] as Hypertension Digital Medicine Contract    ROS  Review of Systems   Constitutional:  Negative for chills, fatigue, fever and unexpected weight change.   HENT:  Negative for congestion, ear discharge, ear pain and postnasal drip.    Eyes:  Negative for photophobia, pain and visual disturbance.   Respiratory:  Negative for cough, shortness of breath and wheezing.    Cardiovascular:  Negative for chest pain, palpitations and leg swelling.   Gastrointestinal:  Negative for abdominal pain, constipation, diarrhea, nausea and vomiting.   Genitourinary:  Negative for dysuria, frequency, urgency and vaginal discharge.   Musculoskeletal:  Positive for arthralgias and back pain. Negative for joint swelling and neck stiffness.   Skin:  Negative for rash.   Neurological:  Negative for weakness and headaches.   Psychiatric/Behavioral:  Negative for dysphoric mood and sleep disturbance. The patient is not nervous/anxious.          Physical Exam  Vitals:    07/28/23 0848   BP: 118/76   BP Location: Left arm   Patient Position: Sitting   BP Method: Medium (Manual)   Pulse: 79   Temp: 98.9 °F (37.2 °C)   TempSrc: Oral   SpO2: 99%   Weight: 79.8 kg (175 lb 14.8 oz)   Height: 5' 1" (1.549 m)    Body mass index is 33.24 kg/m².  Weight: 79.8 kg (175 lb 14.8 oz)   Height: 5' 1" (154.9 cm)     Physical Exam  Constitutional:       Appearance: She is well-developed.   HENT:      Head: Normocephalic and atraumatic.      Right Ear: External ear normal.      Left Ear: External ear normal.      Nose: Nose normal.   Eyes:      Extraocular Movements: Extraocular " movements intact.   Cardiovascular:      Rate and Rhythm: Normal rate.   Pulmonary:      Effort: Pulmonary effort is normal.   Musculoskeletal:         General: Normal range of motion.      Cervical back: Normal range of motion.   Skin:     General: Skin is warm and dry.   Neurological:      Mental Status: She is alert and oriented to person, place, and time.   Psychiatric:         Behavior: Behavior normal.           Health Maintenance         Date Due Completion Date    HPV Vaccines (1 - 2-dose series) Never done ---    TETANUS VACCINE 02/07/2018 2/7/2008    COVID-19 Vaccine (4 - Pfizer series) 01/27/2022 12/2/2021    Influenza Vaccine (1) 09/01/2023 3/9/2023    Pap Smear 03/24/2025 3/24/2022          Health maintenance reviewed at this time.    Assessment & Plan  Pure hypercholesterolemia  -     pravastatin (PRAVACHOL) 20 MG tablet; Take 1 tablet (20 mg total) by mouth once daily.  Dispense: 90 tablet; Refill: 3    Will switch back to pravastatin.     Will have the patient get the cmp done to assess liver function.    Pain of lower extremity, unspecified laterality  -     methocarbamoL (ROBAXIN) 500 MG Tab; Take 2 tablets (1,000 mg total) by mouth 3 (three) times daily as needed (Pain).  Dispense: 30 tablet; Refill: 0  The current medical regimen is effective;  continue present plan and medications.    Bilateral low back pain with sciatica, sciatica laterality unspecified, unspecified chronicity  At this time will have the patient monitor symptoms. May get imagining if pain persist.     Hypertension, benign  The current medical regimen is effective;  continue present plan and medications.         Follow-up: Follow up if symptoms worsen or fail to improve.

## 2023-08-22 ENCOUNTER — LAB VISIT (OUTPATIENT)
Dept: LAB | Facility: HOSPITAL | Age: 28
End: 2023-08-22
Attending: STUDENT IN AN ORGANIZED HEALTH CARE EDUCATION/TRAINING PROGRAM
Payer: MEDICAID

## 2023-08-22 DIAGNOSIS — D64.9 NORMOCYTIC ANEMIA: ICD-10-CM

## 2023-08-22 LAB
BASOPHILS # BLD AUTO: 0.04 K/UL (ref 0–0.2)
BASOPHILS NFR BLD: 0.7 % (ref 0–1.9)
DIFFERENTIAL METHOD: ABNORMAL
EOSINOPHIL # BLD AUTO: 0.2 K/UL (ref 0–0.5)
EOSINOPHIL NFR BLD: 3.9 % (ref 0–8)
ERYTHROCYTE [DISTWIDTH] IN BLOOD BY AUTOMATED COUNT: 11.3 % (ref 11.5–14.5)
FERRITIN SERPL-MCNC: 55 NG/ML (ref 20–300)
HCT VFR BLD AUTO: 40.9 % (ref 37–48.5)
HGB BLD-MCNC: 13.9 G/DL (ref 12–16)
IMM GRANULOCYTES # BLD AUTO: 0.01 K/UL (ref 0–0.04)
IMM GRANULOCYTES NFR BLD AUTO: 0.2 % (ref 0–0.5)
IRON SERPL-MCNC: 130 UG/DL (ref 30–160)
LYMPHOCYTES # BLD AUTO: 1.7 K/UL (ref 1–4.8)
LYMPHOCYTES NFR BLD: 31.7 % (ref 18–48)
MCH RBC QN AUTO: 31.1 PG (ref 27–31)
MCHC RBC AUTO-ENTMCNC: 34 G/DL (ref 32–36)
MCV RBC AUTO: 92 FL (ref 82–98)
MONOCYTES # BLD AUTO: 0.5 K/UL (ref 0.3–1)
MONOCYTES NFR BLD: 9.2 % (ref 4–15)
NEUTROPHILS # BLD AUTO: 3 K/UL (ref 1.8–7.7)
NEUTROPHILS NFR BLD: 54.3 % (ref 38–73)
NRBC BLD-RTO: 0 /100 WBC
PLATELET # BLD AUTO: 231 K/UL (ref 150–450)
PMV BLD AUTO: 11.3 FL (ref 9.2–12.9)
RBC # BLD AUTO: 4.47 M/UL (ref 4–5.4)
SATURATED IRON: 34 % (ref 20–50)
TOTAL IRON BINDING CAPACITY: 379 UG/DL (ref 250–450)
TRANSFERRIN SERPL-MCNC: 256 MG/DL (ref 200–375)
WBC # BLD AUTO: 5.45 K/UL (ref 3.9–12.7)

## 2023-08-22 PROCEDURE — 84238 ASSAY NONENDOCRINE RECEPTOR: CPT | Performed by: STUDENT IN AN ORGANIZED HEALTH CARE EDUCATION/TRAINING PROGRAM

## 2023-08-22 PROCEDURE — 82728 ASSAY OF FERRITIN: CPT | Performed by: STUDENT IN AN ORGANIZED HEALTH CARE EDUCATION/TRAINING PROGRAM

## 2023-08-22 PROCEDURE — 85025 COMPLETE CBC W/AUTO DIFF WBC: CPT | Performed by: STUDENT IN AN ORGANIZED HEALTH CARE EDUCATION/TRAINING PROGRAM

## 2023-08-22 PROCEDURE — 36415 COLL VENOUS BLD VENIPUNCTURE: CPT | Mod: PN | Performed by: STUDENT IN AN ORGANIZED HEALTH CARE EDUCATION/TRAINING PROGRAM

## 2023-08-22 PROCEDURE — 84466 ASSAY OF TRANSFERRIN: CPT | Performed by: STUDENT IN AN ORGANIZED HEALTH CARE EDUCATION/TRAINING PROGRAM

## 2023-08-22 PROCEDURE — 83540 ASSAY OF IRON: CPT | Performed by: STUDENT IN AN ORGANIZED HEALTH CARE EDUCATION/TRAINING PROGRAM

## 2023-08-24 LAB — STFR SERPL-MCNC: 1.5 MG/L (ref 1.8–4.6)

## 2023-08-28 ENCOUNTER — OFFICE VISIT (OUTPATIENT)
Dept: HEMATOLOGY/ONCOLOGY | Facility: CLINIC | Age: 28
End: 2023-08-28
Payer: MEDICAID

## 2023-08-28 DIAGNOSIS — G89.29 CHRONIC NONINTRACTABLE HEADACHE, UNSPECIFIED HEADACHE TYPE: ICD-10-CM

## 2023-08-28 DIAGNOSIS — Z09 FOLLOW-UP EXAM: ICD-10-CM

## 2023-08-28 DIAGNOSIS — E78.5 HYPERLIPIDEMIA, UNSPECIFIED HYPERLIPIDEMIA TYPE: ICD-10-CM

## 2023-08-28 DIAGNOSIS — N94.6 DYSMENORRHEA: ICD-10-CM

## 2023-08-28 DIAGNOSIS — D50.0 IRON DEFICIENCY ANEMIA DUE TO CHRONIC BLOOD LOSS: ICD-10-CM

## 2023-08-28 DIAGNOSIS — R51.9 CHRONIC NONINTRACTABLE HEADACHE, UNSPECIFIED HEADACHE TYPE: ICD-10-CM

## 2023-08-28 DIAGNOSIS — D64.9 NORMOCYTIC ANEMIA: Primary | ICD-10-CM

## 2023-08-28 DIAGNOSIS — D50.9 IRON DEFICIENCY ANEMIA, UNSPECIFIED IRON DEFICIENCY ANEMIA TYPE: ICD-10-CM

## 2023-08-28 DIAGNOSIS — I10 PRIMARY HYPERTENSION: ICD-10-CM

## 2023-08-28 PROCEDURE — 99213 PR OFFICE/OUTPT VISIT, EST, LEVL III, 20-29 MIN: ICD-10-PCS | Mod: 95,,, | Performed by: STUDENT IN AN ORGANIZED HEALTH CARE EDUCATION/TRAINING PROGRAM

## 2023-08-28 PROCEDURE — 99213 OFFICE O/P EST LOW 20 MIN: CPT | Mod: 95,,, | Performed by: STUDENT IN AN ORGANIZED HEALTH CARE EDUCATION/TRAINING PROGRAM

## 2023-08-28 NOTE — PROGRESS NOTES
Hematology- Oncology Clinic Note :     2023    The patient location is: home  The chief complaint leading to consultation is: Anemia  Visit type: Virtual visit with synchronous audio and video  Total time spent with patient: 15 minutes  Each patient to whom he or she provides medical services by telemedicine is:  (1) informed of the relationship between the physician and patient and the respective role of any other health care provider with respect to management of the patient; and (2) notified that he or she may decline to receive medical services by telemedicine and may withdraw from such care at any time.            HPI      Pt is a 28 yo  F with pmhx of headaches (chronic), HTN, HLD, dysmenorrhea who presents as a referral to establish care for anemia.  Labs significant for iron def and pt has been experiencing ice cravings and worsening fatigue for the past couple of months.  Cycles tend to be heavy and cramping despite being on OCPs for past 3-4 years.      Interval hx:    Pt presents for follow up and symptoms resolved s/p IV iron.  Labs reviewed with pt and all questions answered to her satisfaction.  Headaches are improving and so are her labs.  Cycles are getting lighter.           Active Problem List with Overview Notes    Diagnosis Date Noted    Iron deficiency anemia due to chronic blood loss 2023    Chronic nonintractable headache 2023    Hypertension, benign 2023    Pure hypercholesterolemia 2023     Lab Results   Component Value Date    CHOL 279 (H) 2023    CHOL 276 (H) 10/06/2022    CHOL 272 (H) 2022     Lab Results   Component Value Date    HDL 95 (H) 2023    HDL 89 (H) 10/06/2022    HDL 85 (H) 2022     Lab Results   Component Value Date    LDLCALC 170.2 (H) 2023    LDLCALC 163.2 (H) 10/06/2022    LDLCALC 168.8 (H) 2022     Lab Results   Component Value Date    TRIG 69 2023    TRIG 119 10/06/2022    TRIG 91 2022          Dysmenorrhea 02/17/2023       Patient Active Problem List    Diagnosis Date Noted    Iron deficiency anemia due to chronic blood loss 02/17/2023    Chronic nonintractable headache 02/17/2023    Hypertension, benign 02/17/2023    Pure hypercholesterolemia 02/17/2023    Dysmenorrhea 02/17/2023     No past medical history on file.   Past Surgical History:   Procedure Laterality Date    APPENDECTOMY      BREAST SURGERY      KIDNEY SURGERY      Blocked tube      (Not in a hospital admission)    Review of patient's allergies indicates:  No Known Allergies   Social History     Tobacco Use    Smoking status: Never    Smokeless tobacco: Never   Substance Use Topics    Alcohol use: No      Family History   Problem Relation Age of Onset    Breast cancer Neg Hx     Colon cancer Neg Hx     Ovarian cancer Neg Hx         Review of Systems :  Review of Systems   Constitutional:  Negative for fever and malaise/fatigue.   HENT:  Negative for congestion, hearing loss and nosebleeds.    Eyes:  Negative for blurred vision and pain.   Respiratory:  Negative for cough and shortness of breath.    Cardiovascular:  Negative for chest pain, palpitations, claudication and leg swelling.   Gastrointestinal:  Negative for abdominal pain, blood in stool, constipation, diarrhea and heartburn.   Genitourinary:  Negative for dysuria and hematuria.   Musculoskeletal:  Negative for joint pain and myalgias.   Skin:  Negative for itching and rash.   Neurological:  Positive for headaches (chronic). Negative for dizziness and weakness.   Endo/Heme/Allergies:         Heavy periods   Psychiatric/Behavioral:  Negative for depression. The patient is not nervous/anxious.        Physical Exam :  Wt Readings from Last 3 Encounters:   07/28/23 79.8 kg (175 lb 14.8 oz)   06/30/23 78.2 kg (172 lb 6.4 oz)   06/28/23 77.6 kg (171 lb)     Temp Readings from Last 3 Encounters:   07/28/23 98.9 °F (37.2 °C) (Oral)   06/30/23 98.4 °F (36.9 °C) (Oral)   06/28/23 98.7 °F  (37.1 °C) (Oral)     BP Readings from Last 3 Encounters:   07/28/23 118/76   06/30/23 124/82   06/28/23 (!) 146/94     Pulse Readings from Last 3 Encounters:   07/28/23 79   06/30/23 84   06/28/23 87     There is no height or weight on file to calculate BMI.    Physical Exam  Vitals reviewed.   HENT:      Head: Normocephalic.      Nose: Nose normal.      Mouth/Throat:      Mouth: Mucous membranes are moist.   Eyes:      Pupils: Pupils are equal, round, and reactive to light.   Cardiovascular:      Heart sounds: Normal heart sounds.   Pulmonary:      Effort: Pulmonary effort is normal.      Breath sounds: Normal breath sounds.   Abdominal:      General: Abdomen is flat.   Musculoskeletal:         General: Normal range of motion.      Cervical back: Normal range of motion and neck supple.   Skin:     General: Skin is warm and dry.   Neurological:      Mental Status: She is alert and oriented to person, place, and time.   Psychiatric:         Mood and Affect: Mood normal.         Behavior: Behavior normal.         Thought Content: Thought content normal.         Judgment: Judgment normal.           Pertinent Diagnostic studies:    No results found for this or any previous visit (from the past 24 hour(s)).    Assessment/Plan :     Anemia, iron deficiency  -Labs significant for iron def anemia  -Most likely from heavy menses which are resolving with IUD  -Pt denies blood in urine or stool  -s/p IV iron with resolution of symptoms and normalization of labs, still improving  -RTC in 6 months with repeat labs week before      HTN  -Stable on amlodipine  -Per PCP      HLD  -Diet and exercise advised  -Per PCP, continue pravastatin      Dysmenorrhea  -Pt complains of painful heavy cycles for past 3-4 years  -IUD helping       Hx of chronic headaches  -Improving on rizatriptan  -Has follow up with neurology      Time spent on Case: 20 minutes      Summary of orders placed this encounter:  No orders of the defined types were  placed in this encounter.      Future Appointments   Date Time Provider Department Center   8/28/2023  3:30 PM Camelia Khalil MD [facility] HEM ONC [facility]   10/27/2023  8:00 AM LAB, [facility] DRAW STATION [facility] LAB [facility]   11/2/2023  2:20 PM Rodger Conteh Jr., MD [facility]           Camelia Khalil MD   Hematology/oncology, [facility]

## 2023-10-17 ENCOUNTER — PATIENT MESSAGE (OUTPATIENT)
Dept: ADMINISTRATIVE | Facility: OTHER | Age: 28
End: 2023-10-17
Payer: MEDICAID

## 2023-10-27 ENCOUNTER — LAB VISIT (OUTPATIENT)
Dept: LAB | Facility: HOSPITAL | Age: 28
End: 2023-10-27
Attending: FAMILY MEDICINE
Payer: MEDICAID

## 2023-10-27 DIAGNOSIS — G44.229 CHRONIC TENSION-TYPE HEADACHE, NOT INTRACTABLE: Chronic | ICD-10-CM

## 2023-10-27 LAB
ALBUMIN SERPL BCP-MCNC: 4.1 G/DL (ref 3.5–5.2)
ALP SERPL-CCNC: 80 U/L (ref 55–135)
ALT SERPL W/O P-5'-P-CCNC: 13 U/L (ref 10–44)
ANION GAP SERPL CALC-SCNC: 8 MMOL/L (ref 8–16)
AST SERPL-CCNC: 21 U/L (ref 10–40)
BILIRUB SERPL-MCNC: 0.4 MG/DL (ref 0.1–1)
BUN SERPL-MCNC: 9 MG/DL (ref 6–20)
CALCIUM SERPL-MCNC: 9.4 MG/DL (ref 8.7–10.5)
CHLORIDE SERPL-SCNC: 109 MMOL/L (ref 95–110)
CHOLEST SERPL-MCNC: 172 MG/DL (ref 120–199)
CHOLEST/HDLC SERPL: 2.5 {RATIO} (ref 2–5)
CO2 SERPL-SCNC: 26 MMOL/L (ref 23–29)
CREAT SERPL-MCNC: 1.1 MG/DL (ref 0.5–1.4)
EST. GFR  (NO RACE VARIABLE): >60 ML/MIN/1.73 M^2
GLUCOSE SERPL-MCNC: 99 MG/DL (ref 70–110)
HDLC SERPL-MCNC: 68 MG/DL (ref 40–75)
HDLC SERPL: 39.5 % (ref 20–50)
LDLC SERPL CALC-MCNC: 95.8 MG/DL (ref 63–159)
NONHDLC SERPL-MCNC: 104 MG/DL
POTASSIUM SERPL-SCNC: 4.3 MMOL/L (ref 3.5–5.1)
PROT SERPL-MCNC: 7.2 G/DL (ref 6–8.4)
SODIUM SERPL-SCNC: 143 MMOL/L (ref 136–145)
TRIGL SERPL-MCNC: 41 MG/DL (ref 30–150)

## 2023-10-27 PROCEDURE — 36415 COLL VENOUS BLD VENIPUNCTURE: CPT | Mod: PN | Performed by: FAMILY MEDICINE

## 2023-10-27 PROCEDURE — 80053 COMPREHEN METABOLIC PANEL: CPT | Performed by: FAMILY MEDICINE

## 2023-10-27 PROCEDURE — 80061 LIPID PANEL: CPT | Performed by: FAMILY MEDICINE

## 2023-11-02 ENCOUNTER — OFFICE VISIT (OUTPATIENT)
Dept: FAMILY MEDICINE | Facility: CLINIC | Age: 28
End: 2023-11-02
Payer: MEDICAID

## 2023-11-02 VITALS
BODY MASS INDEX: 33.05 KG/M2 | HEART RATE: 93 BPM | WEIGHT: 175.06 LBS | OXYGEN SATURATION: 99 % | TEMPERATURE: 98 F | DIASTOLIC BLOOD PRESSURE: 78 MMHG | SYSTOLIC BLOOD PRESSURE: 128 MMHG | HEIGHT: 61 IN

## 2023-11-02 DIAGNOSIS — E78.2 MIXED HYPERLIPIDEMIA: Chronic | ICD-10-CM

## 2023-11-02 DIAGNOSIS — Z28.21 INFLUENZA VACCINATION DECLINED: ICD-10-CM

## 2023-11-02 DIAGNOSIS — Z00.00 ROUTINE HEALTH MAINTENANCE: Primary | ICD-10-CM

## 2023-11-02 DIAGNOSIS — I10 HYPERTENSION, BENIGN: Chronic | ICD-10-CM

## 2023-11-02 DIAGNOSIS — L30.1 DYSHIDROTIC ECZEMA: ICD-10-CM

## 2023-11-02 PROCEDURE — 99395 PREV VISIT EST AGE 18-39: CPT | Mod: S$PBB,,, | Performed by: FAMILY MEDICINE

## 2023-11-02 PROCEDURE — 1160F RVW MEDS BY RX/DR IN RCRD: CPT | Mod: CPTII,,, | Performed by: FAMILY MEDICINE

## 2023-11-02 PROCEDURE — 1159F PR MEDICATION LIST DOCUMENTED IN MEDICAL RECORD: ICD-10-PCS | Mod: CPTII,,, | Performed by: FAMILY MEDICINE

## 2023-11-02 PROCEDURE — 99999 PR PBB SHADOW E&M-EST. PATIENT-LVL III: ICD-10-PCS | Mod: PBBFAC,,, | Performed by: FAMILY MEDICINE

## 2023-11-02 PROCEDURE — 3008F PR BODY MASS INDEX (BMI) DOCUMENTED: ICD-10-PCS | Mod: CPTII,,, | Performed by: FAMILY MEDICINE

## 2023-11-02 PROCEDURE — 99999 PR PBB SHADOW E&M-EST. PATIENT-LVL III: CPT | Mod: PBBFAC,,, | Performed by: FAMILY MEDICINE

## 2023-11-02 PROCEDURE — 3074F PR MOST RECENT SYSTOLIC BLOOD PRESSURE < 130 MM HG: ICD-10-PCS | Mod: CPTII,,, | Performed by: FAMILY MEDICINE

## 2023-11-02 PROCEDURE — 3008F BODY MASS INDEX DOCD: CPT | Mod: CPTII,,, | Performed by: FAMILY MEDICINE

## 2023-11-02 PROCEDURE — 3078F DIAST BP <80 MM HG: CPT | Mod: CPTII,,, | Performed by: FAMILY MEDICINE

## 2023-11-02 PROCEDURE — 99395 PR PREVENTIVE VISIT,EST,18-39: ICD-10-PCS | Mod: S$PBB,,, | Performed by: FAMILY MEDICINE

## 2023-11-02 PROCEDURE — 99213 OFFICE O/P EST LOW 20 MIN: CPT | Mod: PBBFAC,PN | Performed by: FAMILY MEDICINE

## 2023-11-02 PROCEDURE — 3074F SYST BP LT 130 MM HG: CPT | Mod: CPTII,,, | Performed by: FAMILY MEDICINE

## 2023-11-02 PROCEDURE — 3078F PR MOST RECENT DIASTOLIC BLOOD PRESSURE < 80 MM HG: ICD-10-PCS | Mod: CPTII,,, | Performed by: FAMILY MEDICINE

## 2023-11-02 PROCEDURE — 1160F PR REVIEW ALL MEDS BY PRESCRIBER/CLIN PHARMACIST DOCUMENTED: ICD-10-PCS | Mod: CPTII,,, | Performed by: FAMILY MEDICINE

## 2023-11-02 PROCEDURE — 1159F MED LIST DOCD IN RCRD: CPT | Mod: CPTII,,, | Performed by: FAMILY MEDICINE

## 2023-11-02 RX ORDER — LABETALOL 100 MG/1
100 TABLET, FILM COATED ORAL 2 TIMES DAILY
Qty: 180 TABLET | Refills: 3 | Status: SHIPPED | OUTPATIENT
Start: 2023-11-02

## 2023-11-02 RX ORDER — TRIAMCINOLONE ACETONIDE 1 MG/G
OINTMENT TOPICAL 2 TIMES DAILY
Qty: 80 G | Refills: 5 | Status: SHIPPED | OUTPATIENT
Start: 2023-11-02

## 2023-11-02 RX ORDER — AMLODIPINE BESYLATE 10 MG/1
10 TABLET ORAL DAILY
Qty: 90 TABLET | Refills: 3 | Status: SHIPPED | OUTPATIENT
Start: 2023-11-02

## 2023-11-02 RX ORDER — PRAVASTATIN SODIUM 20 MG/1
20 TABLET ORAL DAILY
Qty: 90 TABLET | Refills: 3 | Status: SHIPPED | OUTPATIENT
Start: 2023-11-02 | End: 2024-11-01

## 2023-11-02 NOTE — LETTER
November 2, 2023      [facility]  [address]  [address]  [place] [address]  Phone: [phone]       Patient: Bailey Arita   YOB: 1995  Date of Visit: 11/02/2023    To Whom It May Concern:    Juan Arita  was at [facility] on 11/02/2023. The patient may return to school on 11/03/2023 with no restrictions. If you have any questions or concerns, or if I can be of further assistance, please do not hesitate to contact me.    Sincerely,    Rodger Conteh Jr., MD

## 2023-11-06 PROBLEM — E78.00 PURE HYPERCHOLESTEROLEMIA: Chronic | Status: ACTIVE | Noted: 2023-02-17

## 2023-11-06 PROBLEM — E78.2 MIXED HYPERLIPIDEMIA: Chronic | Status: ACTIVE | Noted: 2023-02-17

## 2023-11-06 NOTE — PROGRESS NOTES
"  Patient Name: Bailey Arita    : 1995  MRN: [id number]      Subjective:     Patient ID: Bailey is a 27 y.o. female    Chief Complaint:  Annual Exam    27-year-old female with hypertension, hyperlipidemia, and obesity comes in for annual health maintenance exam.  She reports compliance with her medications.    Patient also reports frequent flare-ups of a eczema rash on her hands.  It is worsened with things involving water contact.         Review of Systems   Constitutional:  Negative for activity change and unexpected weight change.   HENT:  Negative for hearing loss, rhinorrhea and trouble swallowing.    Eyes:  Negative for discharge and visual disturbance.   Respiratory:  Negative for chest tightness, shortness of breath and wheezing.    Cardiovascular:  Negative for chest pain and palpitations.   Gastrointestinal:  Negative for blood in stool, diarrhea and vomiting.   Endocrine: Negative for polydipsia and polyuria.   Genitourinary:  Negative for difficulty urinating, dysuria and hematuria.   Musculoskeletal:  Negative for arthralgias, joint swelling and neck pain.   Neurological:  Negative for weakness.   Psychiatric/Behavioral:  Negative for confusion and dysphoric mood.         Objective:   /78 Comment: Patient reported home reading when taking both amlodipine and labetalol  Pulse 93   Temp 98.4 °F (36.9 °C) (Oral)   Ht 5' 1" (1.549 m)   Wt 79.4 kg (175 lb 0.7 oz)   LMP 10/26/2023 (Approximate)   SpO2 99%   BMI 33.07 kg/m²     Physical Exam  Vitals reviewed.   Constitutional:       General: She is not in acute distress.     Appearance: She is obese.   HENT:      Head: Normocephalic and atraumatic.      Right Ear: Ear canal and external ear normal.      Left Ear: Ear canal and external ear normal.      Nose: Nose normal.      Mouth/Throat:      Mouth: Mucous membranes are moist.      Pharynx: No oropharyngeal exudate or posterior oropharyngeal erythema.   Eyes:      Extraocular Movements: " Extraocular movements intact.      Conjunctiva/sclera: Conjunctivae normal.      Pupils: Pupils are equal, round, and reactive to light.   Cardiovascular:      Rate and Rhythm: Normal rate and regular rhythm.      Pulses: Normal pulses.      Heart sounds: Normal heart sounds.   Pulmonary:      Effort: Pulmonary effort is normal. No respiratory distress.      Breath sounds: No wheezing or rales.   Abdominal:      General: Abdomen is flat. Bowel sounds are normal. There is no distension.      Palpations: Abdomen is soft.      Tenderness: There is no abdominal tenderness. There is no guarding.   Musculoskeletal:      Cervical back: Normal range of motion. No rigidity or tenderness.   Lymphadenopathy:      Cervical: No cervical adenopathy.   Skin:     General: Skin is warm.      Capillary Refill: Capillary refill takes less than 2 seconds.   Neurological:      Mental Status: She is alert and oriented to person, place, and time.      Cranial Nerves: No cranial nerve deficit.      Sensory: No sensory deficit.   Psychiatric:         Mood and Affect: Mood normal.         Behavior: Behavior normal.        Lab Visit on 10/27/2023   Component Date Value Ref Range Status    Sodium 10/27/2023 143  136 - 145 mmol/L Final    Potassium 10/27/2023 4.3  3.5 - 5.1 mmol/L Final    Chloride 10/27/2023 109  95 - 110 mmol/L Final    CO2 10/27/2023 26  23 - 29 mmol/L Final    Glucose 10/27/2023 99  70 - 110 mg/dL Final    BUN 10/27/2023 9  6 - 20 mg/dL Final    Creatinine 10/27/2023 1.1  0.5 - 1.4 mg/dL Final    Calcium 10/27/2023 9.4  8.7 - 10.5 mg/dL Final    Total Protein 10/27/2023 7.2  6.0 - 8.4 g/dL Final    Albumin 10/27/2023 4.1  3.5 - 5.2 g/dL Final    Total Bilirubin 10/27/2023 0.4  0.1 - 1.0 mg/dL Final    Comment: For infants and newborns, interpretation of results should be based  on gestational age, weight and in agreement with clinical  observations.    Premature Infant recommended reference ranges:  Up to 24  hours.............<8.0 mg/dL  Up to 48 hours............<12.0 mg/dL  3-5 days..................<15.0 mg/dL  6-29 days.................<15.0 mg/dL      Alkaline Phosphatase 10/27/2023 80  55 - 135 U/L Final    AST 10/27/2023 21  10 - 40 U/L Final    ALT 10/27/2023 13  10 - 44 U/L Final    eGFR 10/27/2023 >60  >60 mL/min/1.73 m^2 Final    Anion Gap 10/27/2023 8  8 - 16 mmol/L Final    Cholesterol 10/27/2023 172  120 - 199 mg/dL Final    Comment: The National Cholesterol Education Program (NCEP) has set the  following guidelines (reference ranges) for Cholesterol:  Optimal.....................<200 mg/dL  Borderline High.............200-239 mg/dL  High........................> or = 240 mg/dL      Triglycerides 10/27/2023 41  30 - 150 mg/dL Final    Comment: The National Cholesterol Education Program (NCEP) has set the  following guidelines (reference values) for triglycerides:  Normal......................<150 mg/dL  Borderline High.............150-199 mg/dL  High........................200-499 mg/dL      HDL 10/27/2023 68  40 - 75 mg/dL Final    Comment: The National Cholesterol Education Program (NCEP) has set the  following guidelines (reference values) for HDL Cholesterol:  Low...............<40 mg/dL  Optimal...........>60 mg/dL      LDL Cholesterol 10/27/2023 95.8  63.0 - 159.0 mg/dL Final    Comment: The National Cholesterol Education Program (NCEP) has set the  following guidelines (reference values) for LDL Cholesterol:  Optimal.......................<130 mg/dL  Borderline High...............130-159 mg/dL  High..........................160-189 mg/dL  Very High.....................>190 mg/dL      HDL/Cholesterol Ratio 10/27/2023 39.5  20.0 - 50.0 % Final    Total Cholesterol/HDL Ratio 10/27/2023 2.5  2.0 - 5.0 Final    Non-HDL Cholesterol 10/27/2023 104  mg/dL Final    Comment: Risk category and Non-HDL cholesterol goals:  Coronary heart disease (CHD)or equivalent (10-year risk of CHD >20%):  Non-HDL cholesterol  goal     <130 mg/dL  Two or more CHD risk factors and 10-year risk of CHD <= 20%:  Non-HDL cholesterol goal     <160 mg/dL  0 to 1 CHD risk factor:  Non-HDL cholesterol goal     <190 mg/dL         Assessment        ICD-10-CM ICD-9-CM   1. Routine health maintenance  Z00.00 V70.0   2. Mixed hyperlipidemia  E78.2 272.2   3. Hypertension, benign  I10 401.1   4. Dyshidrotic eczema  L30.1 705.81   5. Influenza vaccination declined  Z28.21 V64.06         Plan:     1. Routine health maintenance  Age appropriate screenings, vaccinations, and recommendations reviewed and discussed.  Appropriate orders placed and previous results reviewed.    2. Mixed hyperlipidemia  Assessment & Plan:  Lab Results   Component Value Date    CHOL 172 10/27/2023    CHOL 279 (H) 04/20/2023    CHOL 276 (H) 10/06/2022     Lab Results   Component Value Date    HDL 68 10/27/2023    HDL 95 (H) 04/20/2023    HDL 89 (H) 10/06/2022     Lab Results   Component Value Date    LDLCALC 95.8 10/27/2023    LDLCALC 170.2 (H) 04/20/2023    LDLCALC 163.2 (H) 10/06/2022     Lab Results   Component Value Date    TRIG 41 10/27/2023    TRIG 69 04/20/2023    TRIG 119 10/06/2022     Lab Results   Component Value Date    CHOLHDL 39.5 10/27/2023    CHOLHDL 34.1 04/20/2023    CHOLHDL 32.2 10/06/2022        The ASCVD Risk score (Arcelia DK, et al., 2019) failed to calculate for the following reasons:    The 2019 ASCVD risk score is only valid for ages 40 to 79      Continue pravastatin.    Orders:  -     pravastatin (PRAVACHOL) 20 MG tablet; Take 1 tablet (20 mg total) by mouth once daily.  Dispense: 90 tablet; Refill: 3    3. Hypertension, benign  Assessment & Plan:  BP Readings from Last 3 Encounters:   11/02/23 128/78   07/28/23 118/76   06/30/23 124/82        ACC/AHA guidelines on blood pressure goals reviewed.  Reinforced correct way of measuring blood pressure.   Fair blood pressure control with current regimen.  Will continue amlodipine 10 milligrams daily, and  labetalol 100 milligrams twice a day.    Orders:  -     amLODIPine (NORVASC) 10 MG tablet; Take 1 tablet (10 mg total) by mouth once daily.  Dispense: 90 tablet; Refill: 3  -     labetaloL (NORMODYNE) 100 MG tablet; Take 1 tablet (100 mg total) by mouth 2 (two) times daily.  Dispense: 180 tablet; Refill: 3    4. Dyshidrotic eczema  -     triamcinolone acetonide 0.1% (KENALOG) 0.1 % ointment; Apply topically 2 (two) times daily. To bilateral hands for up to 7 days for eczema flair up  Dispense: 80 g; Refill: 5  Advised using daily moisture.    Course of triamcinolone topically for flare-ups.    5. Influenza vaccination declined  Patient declines flu vaccination.         -Rodger Conteh Jr., MD, AAHIVS      This office note has been dictated.  This dictation has been generated using M-Modal Fluency Direct dictation; some phonetic errors may occur.         There are no Patient Instructions on file for this visit.      Future Appointments   Date Time Provider Department Center   2/28/2024 11:20 AM [facility], [facility]   5/7/2024 10:20 AM Rodger Conteh Jr., MD [facility]

## 2023-11-06 NOTE — ASSESSMENT & PLAN NOTE
Lab Results   Component Value Date    CHOL 172 10/27/2023    CHOL 279 (H) 04/20/2023    CHOL 276 (H) 10/06/2022     Lab Results   Component Value Date    HDL 68 10/27/2023    HDL 95 (H) 04/20/2023    HDL 89 (H) 10/06/2022     Lab Results   Component Value Date    LDLCALC 95.8 10/27/2023    LDLCALC 170.2 (H) 04/20/2023    LDLCALC 163.2 (H) 10/06/2022     Lab Results   Component Value Date    TRIG 41 10/27/2023    TRIG 69 04/20/2023    TRIG 119 10/06/2022     Lab Results   Component Value Date    CHOLHDL 39.5 10/27/2023    CHOLHDL 34.1 04/20/2023    CHOLHDL 32.2 10/06/2022        The ASCVD Risk score (Arcelia SCHRADER, et al., 2019) failed to calculate for the following reasons:    The 2019 ASCVD risk score is only valid for ages 40 to 79      Continue pravastatin.

## 2023-11-06 NOTE — ASSESSMENT & PLAN NOTE
BP Readings from Last 3 Encounters:   11/02/23 128/78   07/28/23 118/76   06/30/23 124/82        ACC/AHA guidelines on blood pressure goals reviewed.  Reinforced correct way of measuring blood pressure.   Fair blood pressure control with current regimen.  Will continue amlodipine 10 milligrams daily, and labetalol 100 milligrams twice a day.

## 2024-01-31 ENCOUNTER — OFFICE VISIT (OUTPATIENT)
Dept: FAMILY MEDICINE | Facility: CLINIC | Age: 29
End: 2024-01-31
Payer: MEDICAID

## 2024-01-31 VITALS
OXYGEN SATURATION: 99 % | BODY MASS INDEX: 32.22 KG/M2 | TEMPERATURE: 98 F | DIASTOLIC BLOOD PRESSURE: 88 MMHG | HEART RATE: 80 BPM | WEIGHT: 170.63 LBS | RESPIRATION RATE: 16 BRPM | HEIGHT: 61 IN | SYSTOLIC BLOOD PRESSURE: 122 MMHG

## 2024-01-31 DIAGNOSIS — Z11.59 ENCOUNTER FOR SCREENING FOR VIRAL DISEASE: ICD-10-CM

## 2024-01-31 DIAGNOSIS — J02.9 SORE THROAT: Primary | ICD-10-CM

## 2024-01-31 LAB
CTP QC/QA: YES
CTP QC/QA: YES
POC MOLECULAR INFLUENZA A AGN: NEGATIVE
POC MOLECULAR INFLUENZA B AGN: NEGATIVE
S PYO RRNA THROAT QL PROBE: NEGATIVE

## 2024-01-31 PROCEDURE — 99999 PR PBB SHADOW E&M-EST. PATIENT-LVL IV: CPT | Mod: PBBFAC,,, | Performed by: FAMILY MEDICINE

## 2024-01-31 PROCEDURE — 87880 STREP A ASSAY W/OPTIC: CPT | Mod: PBBFAC,PN | Performed by: FAMILY MEDICINE

## 2024-01-31 PROCEDURE — 3079F DIAST BP 80-89 MM HG: CPT | Mod: CPTII,,, | Performed by: FAMILY MEDICINE

## 2024-01-31 PROCEDURE — 87502 INFLUENZA DNA AMP PROBE: CPT | Mod: PBBFAC,PN | Performed by: FAMILY MEDICINE

## 2024-01-31 PROCEDURE — 3008F BODY MASS INDEX DOCD: CPT | Mod: CPTII,,, | Performed by: FAMILY MEDICINE

## 2024-01-31 PROCEDURE — 99999PBSHW POCT RAPID STREP A: Mod: PBBFAC,,,

## 2024-01-31 PROCEDURE — 1159F MED LIST DOCD IN RCRD: CPT | Mod: CPTII,,, | Performed by: FAMILY MEDICINE

## 2024-01-31 PROCEDURE — 99999PBSHW POCT INFLUENZA A/B MOLECULAR: Mod: PBBFAC,,,

## 2024-01-31 PROCEDURE — 99214 OFFICE O/P EST MOD 30 MIN: CPT | Mod: PBBFAC,PN | Performed by: FAMILY MEDICINE

## 2024-01-31 PROCEDURE — 87635 SARS-COV-2 COVID-19 AMP PRB: CPT | Performed by: FAMILY MEDICINE

## 2024-01-31 PROCEDURE — 99213 OFFICE O/P EST LOW 20 MIN: CPT | Mod: S$PBB,,, | Performed by: FAMILY MEDICINE

## 2024-01-31 PROCEDURE — 3074F SYST BP LT 130 MM HG: CPT | Mod: CPTII,,, | Performed by: FAMILY MEDICINE

## 2024-01-31 RX ORDER — AMOXICILLIN AND CLAVULANATE POTASSIUM 875; 125 MG/1; MG/1
1 TABLET, FILM COATED ORAL 2 TIMES DAILY
Qty: 20 TABLET | Refills: 0 | Status: SHIPPED | OUTPATIENT
Start: 2024-01-31 | End: 2024-02-10

## 2024-01-31 NOTE — PROGRESS NOTES
Chief Complaint   Patient presents with    Sore Throat    Mouth Lesions       HPI  Bailey Arita is a 28 y.o. female with multiple medical diagnoses as listed in the medical history and problem list that presents for evaluation for sore throat for several days    Sore throat- started  last week, also bumps on the back of her tongue, no fever/chills, no coughing, exposed to covid took two negative tests, no recent antibiotics       ALLERGIES AND MEDICATIONS: updated and reviewed.  Review of patient's allergies indicates:  No Known Allergies  Medication List with Changes/Refills   New Medications    AMOXICILLIN-CLAVULANATE 875-125MG (AUGMENTIN) 875-125 MG PER TABLET    Take 1 tablet by mouth 2 (two) times daily. for 10 days   Current Medications    AMLODIPINE (NORVASC) 10 MG TABLET    Take 1 tablet (10 mg total) by mouth once daily.    DICLOFENAC SODIUM (VOLTAREN) 1 % GEL    Apply 2 g topically 4 (four) times daily as needed.    LABETALOL (NORMODYNE) 100 MG TABLET    Take 1 tablet (100 mg total) by mouth 2 (two) times daily.    LEVONORGESTREL (RONAK) 14 MCG/24 HRS (3 YRS) 13.5 MG IUD    1 Intra Uterine Device by Intrauterine route. Placed by Dr. Christiane Rosado MD on 4/14/23    PRAVASTATIN (PRAVACHOL) 20 MG TABLET    Take 1 tablet (20 mg total) by mouth once daily.    RIZATRIPTAN (MAXALT-MLT) 10 MG DISINTEGRATING TABLET    Take 1 tablet (10 mg total) by mouth as needed for Migraine. May repeat in 2 hours if needed. Max 20mg/24hrs.    TRIAMCINOLONE ACETONIDE 0.1% (KENALOG) 0.1 % OINTMENT    Apply topically 2 (two) times daily. To bilateral hands for up to 7 days for eczema flair up       ROS  Review of Systems   Constitutional:  Negative for chills, diaphoresis, fatigue, fever and unexpected weight change.   HENT:  Positive for sore throat. Negative for rhinorrhea, sinus pressure and tinnitus.    Eyes:  Negative for photophobia and visual disturbance.   Respiratory:  Negative for cough, shortness of breath and  "wheezing.    Cardiovascular:  Negative for chest pain and palpitations.   Gastrointestinal:  Negative for abdominal pain, blood in stool, constipation, diarrhea, nausea and vomiting.   Genitourinary:  Negative for dysuria, flank pain, frequency and vaginal discharge.   Musculoskeletal:  Negative for arthralgias and joint swelling.   Skin:  Negative for rash.   Neurological:  Negative for speech difficulty, weakness, light-headedness and headaches.   Psychiatric/Behavioral:  Negative for behavioral problems and dysphoric mood.        Physical Exam  Vitals:    01/31/24 1136   BP: 122/88   Pulse: 80   Resp: 16   Temp: 98 °F (36.7 °C)   TempSrc: Oral   SpO2: 99%   Weight: 77.4 kg (170 lb 10.2 oz)   Height: 5' 1" (1.549 m)    Body mass index is 32.24 kg/m².  Weight: 77.4 kg (170 lb 10.2 oz)   Height: 5' 1" (154.9 cm)     Physical Exam  Vitals reviewed.   Constitutional:       General: She is not in acute distress.     Appearance: She is well-developed.   HENT:      Head: Normocephalic and atraumatic.      Mouth/Throat:      Pharynx: Posterior oropharyngeal erythema present. No oropharyngeal exudate or uvula swelling.      Comments: Lingual tonsil enlargement  Cardiovascular:      Rate and Rhythm: Normal rate and regular rhythm.      Heart sounds: No murmur heard.     No friction rub. No gallop.   Pulmonary:      Effort: Pulmonary effort is normal. No respiratory distress.      Breath sounds: Normal breath sounds. No wheezing or rales.   Skin:     General: Skin is warm and dry.      Findings: No rash.   Neurological:      Mental Status: She is alert. Mental status is at baseline.   Psychiatric:         Behavior: Behavior normal.         Thought Content: Thought content normal.         Health Maintenance         Date Due Completion Date    TETANUS VACCINE 02/07/2018 2/7/2008    COVID-19 Vaccine (6 - 2023-24 season) 09/01/2023 12/2/2021    Pap Smear 03/24/2025 3/24/2022            Health maintenance reviewed and addressed " as ordered    ASSESSMENT/PLAN       1. Sore throat  Begin tx for strep in case rapid test was false negative  F/u if tonsillar swelling on tongue does not improve  - POCT Rapid Strep A  - COVID-19 Routine Screening; Future  - POCT Influenza A/B Molecular  - COVID-19 Routine Screening  - amoxicillin-clavulanate 875-125mg (AUGMENTIN) 875-125 mg per tablet; Take 1 tablet by mouth 2 (two) times daily. for 10 days  Dispense: 20 tablet; Refill: 0    2. Encounter for screening for viral disease  R/o COVID and flu  - COVID-19 Routine Screening; Future  - COVID-19 Routine Screening        Bessie Marcelo MD  02/01/2024 11:48 AM        No follow-ups on file.    Orders Placed This Encounter   Procedures    COVID-19 Routine Screening    POCT Rapid Strep A    POCT Influenza A/B Molecular

## 2024-01-31 NOTE — LETTER
[address] [address] [facility] ? [place] [address] ? Phone [phone] ? Fax             Return to Work/School    Patient: Bailey Arita  YOB: 1995   Date: 01/31/2024      To Whom It May Concern:     Bailey Arita was in contact with/seen in my office on 01/31/2024. COVID-19 is present in our communities across the [facility]. Not all patients are eligible or appropriate to be tested. In this situation, your employee meets the following criteria:     Bailey Arita has met the criteria for COVID-19 testing based upon symptoms, travel, and/or potential exposure. The test has been completed and is pending results at this time. During this time the employee is not able to work and should be quarantined per the Centers for Disease Control timelines.      If you have any questions or concerns, or if I can be of further assistance, please do not hesitate to contact me.     Sincerely,          Bessie Marcelo MD

## 2024-02-01 LAB — SARS-COV-2 RNA RESP QL NAA+PROBE: NOT DETECTED

## 2024-02-28 ENCOUNTER — LAB VISIT (OUTPATIENT)
Dept: LAB | Facility: HOSPITAL | Age: 29
End: 2024-02-28
Attending: STUDENT IN AN ORGANIZED HEALTH CARE EDUCATION/TRAINING PROGRAM
Payer: MEDICAID

## 2024-02-28 DIAGNOSIS — D64.9 NORMOCYTIC ANEMIA: ICD-10-CM

## 2024-02-28 LAB
BASOPHILS # BLD AUTO: 0.02 K/UL (ref 0–0.2)
BASOPHILS NFR BLD: 0.4 % (ref 0–1.9)
DIFFERENTIAL METHOD BLD: ABNORMAL
EOSINOPHIL # BLD AUTO: 0.2 K/UL (ref 0–0.5)
EOSINOPHIL NFR BLD: 2.8 % (ref 0–8)
ERYTHROCYTE [DISTWIDTH] IN BLOOD BY AUTOMATED COUNT: 11.1 % (ref 11.5–14.5)
FERRITIN SERPL-MCNC: 53 NG/ML (ref 20–300)
HCT VFR BLD AUTO: 41.2 % (ref 37–48.5)
HGB BLD-MCNC: 13.7 G/DL (ref 12–16)
IMM GRANULOCYTES # BLD AUTO: 0.01 K/UL (ref 0–0.04)
IMM GRANULOCYTES NFR BLD AUTO: 0.2 % (ref 0–0.5)
IRON SERPL-MCNC: 93 UG/DL (ref 30–160)
LYMPHOCYTES # BLD AUTO: 1.4 K/UL (ref 1–4.8)
LYMPHOCYTES NFR BLD: 27.2 % (ref 18–48)
MCH RBC QN AUTO: 30 PG (ref 27–31)
MCHC RBC AUTO-ENTMCNC: 33.3 G/DL (ref 32–36)
MCV RBC AUTO: 90 FL (ref 82–98)
MONOCYTES # BLD AUTO: 0.4 K/UL (ref 0.3–1)
MONOCYTES NFR BLD: 6.8 % (ref 4–15)
NEUTROPHILS # BLD AUTO: 3.3 K/UL (ref 1.8–7.7)
NEUTROPHILS NFR BLD: 62.6 % (ref 38–73)
NRBC BLD-RTO: 0 /100 WBC
PLATELET # BLD AUTO: 247 K/UL (ref 150–450)
PMV BLD AUTO: 10.5 FL (ref 9.2–12.9)
RBC # BLD AUTO: 4.56 M/UL (ref 4–5.4)
SATURATED IRON: 25 % (ref 20–50)
TOTAL IRON BINDING CAPACITY: 369 UG/DL (ref 250–450)
TRANSFERRIN SERPL-MCNC: 249 MG/DL (ref 200–375)
WBC # BLD AUTO: 5.3 K/UL (ref 3.9–12.7)

## 2024-02-28 PROCEDURE — 36415 COLL VENOUS BLD VENIPUNCTURE: CPT | Mod: PN | Performed by: STUDENT IN AN ORGANIZED HEALTH CARE EDUCATION/TRAINING PROGRAM

## 2024-02-28 PROCEDURE — 85025 COMPLETE CBC W/AUTO DIFF WBC: CPT | Performed by: STUDENT IN AN ORGANIZED HEALTH CARE EDUCATION/TRAINING PROGRAM

## 2024-02-28 PROCEDURE — 82728 ASSAY OF FERRITIN: CPT | Performed by: STUDENT IN AN ORGANIZED HEALTH CARE EDUCATION/TRAINING PROGRAM

## 2024-02-28 PROCEDURE — 83540 ASSAY OF IRON: CPT | Performed by: STUDENT IN AN ORGANIZED HEALTH CARE EDUCATION/TRAINING PROGRAM

## 2024-02-29 ENCOUNTER — TELEPHONE (OUTPATIENT)
Dept: HEMATOLOGY/ONCOLOGY | Facility: CLINIC | Age: 29
End: 2024-02-29
Payer: MEDICAID

## 2024-02-29 NOTE — TELEPHONE ENCOUNTER
Left message for pt with lab results. NL          ----- Message from Camelia Khalil MD sent at 2/28/2024  3:34 PM CST -----  Please let her know that labs are stable and we will follow them up on next visit  ----- Message -----  From: [email or web address] [facility]  Sent: 2/28/2024   2:33 PM CST  To: Camelia Khalil MD

## 2024-05-07 ENCOUNTER — OFFICE VISIT (OUTPATIENT)
Dept: FAMILY MEDICINE | Facility: CLINIC | Age: 29
End: 2024-05-07
Payer: MEDICAID

## 2024-05-07 VITALS
OXYGEN SATURATION: 98 % | SYSTOLIC BLOOD PRESSURE: 126 MMHG | WEIGHT: 174.38 LBS | BODY MASS INDEX: 32.92 KG/M2 | TEMPERATURE: 98 F | HEART RATE: 84 BPM | DIASTOLIC BLOOD PRESSURE: 88 MMHG | HEIGHT: 61 IN

## 2024-05-07 DIAGNOSIS — E78.2 MIXED HYPERLIPIDEMIA: Chronic | ICD-10-CM

## 2024-05-07 DIAGNOSIS — E34.8: ICD-10-CM

## 2024-05-07 DIAGNOSIS — G43.009 MIGRAINE WITHOUT AURA AND WITHOUT STATUS MIGRAINOSUS, NOT INTRACTABLE: Chronic | ICD-10-CM

## 2024-05-07 DIAGNOSIS — Z23 NEED FOR VACCINATION: ICD-10-CM

## 2024-05-07 DIAGNOSIS — I10 HYPERTENSION, BENIGN: Primary | Chronic | ICD-10-CM

## 2024-05-07 PROCEDURE — 1159F MED LIST DOCD IN RCRD: CPT | Mod: CPTII,,, | Performed by: FAMILY MEDICINE

## 2024-05-07 PROCEDURE — 99214 OFFICE O/P EST MOD 30 MIN: CPT | Mod: PBBFAC,PN | Performed by: FAMILY MEDICINE

## 2024-05-07 PROCEDURE — 99214 OFFICE O/P EST MOD 30 MIN: CPT | Mod: S$PBB,,, | Performed by: FAMILY MEDICINE

## 2024-05-07 PROCEDURE — 3008F BODY MASS INDEX DOCD: CPT | Mod: CPTII,,, | Performed by: FAMILY MEDICINE

## 2024-05-07 PROCEDURE — 3074F SYST BP LT 130 MM HG: CPT | Mod: CPTII,,, | Performed by: FAMILY MEDICINE

## 2024-05-07 PROCEDURE — 1160F RVW MEDS BY RX/DR IN RCRD: CPT | Mod: CPTII,,, | Performed by: FAMILY MEDICINE

## 2024-05-07 PROCEDURE — 99999 PR PBB SHADOW E&M-EST. PATIENT-LVL IV: CPT | Mod: PBBFAC,,, | Performed by: FAMILY MEDICINE

## 2024-05-07 PROCEDURE — 3079F DIAST BP 80-89 MM HG: CPT | Mod: CPTII,,, | Performed by: FAMILY MEDICINE

## 2024-05-07 RX ORDER — PRAVASTATIN SODIUM 20 MG/1
20 TABLET ORAL DAILY
Qty: 90 TABLET | Refills: 3 | Status: SHIPPED | OUTPATIENT
Start: 2024-05-07 | End: 2025-05-07

## 2024-05-07 RX ORDER — AMLODIPINE BESYLATE 10 MG/1
10 TABLET ORAL DAILY
Qty: 90 TABLET | Refills: 3 | Status: SHIPPED | OUTPATIENT
Start: 2024-05-07

## 2024-05-07 RX ORDER — LABETALOL 100 MG/1
100 TABLET, FILM COATED ORAL 2 TIMES DAILY
Qty: 180 TABLET | Refills: 3 | Status: SHIPPED | OUTPATIENT
Start: 2024-05-07

## 2024-05-07 RX ORDER — RIZATRIPTAN BENZOATE 10 MG/1
10 TABLET, ORALLY DISINTEGRATING ORAL
Qty: 12 TABLET | Refills: 11 | Status: SHIPPED | OUTPATIENT
Start: 2024-05-07 | End: 2024-06-06 | Stop reason: SDUPTHER

## 2024-05-07 RX ORDER — TETANUS TOXOID, REDUCED DIPHTHERIA TOXOID AND ACELLULAR PERTUSSIS VACCINE, ADSORBED 5; 2.5; 8; 8; 2.5 [IU]/.5ML; [IU]/.5ML; UG/.5ML; UG/.5ML; UG/.5ML
0.5 SUSPENSION INTRAMUSCULAR ONCE
Qty: 0.5 ML | Refills: 0 | Status: SHIPPED | OUTPATIENT
Start: 2024-05-07 | End: 2024-05-07

## 2024-05-12 PROBLEM — E34.8: Status: ACTIVE | Noted: 2024-05-12

## 2024-05-12 PROBLEM — G43.009 MIGRAINE WITHOUT AURA AND WITHOUT STATUS MIGRAINOSUS, NOT INTRACTABLE: Chronic | Status: ACTIVE | Noted: 2024-05-12

## 2024-05-13 NOTE — PROGRESS NOTES
Patient Name: Bailey Arita    : 1995  MRN: [id number]      Subjective:     Patient ID: Bailey is a 28 y.o. female    Chief Complaint:  Hypertension    History of Present Illness  The patient presents for evaluation of multiple medical concerns.    The patient's current medication regimen includes amlodipine 10 mg once daily, labetalol 100 mg twice daily, and pravastatin.    The patient continues to experience migraines, albeit at a reduced frequency from 3 to 4 times per month. She expresses a desire to schedule a consultation with a neurologist, as her previous neurologist, who has since retired, has left the practice. Despite the absence of a sufficient supply of Maxalt, she finds the medication effective and requests a refill. The neurologist has recommended a follow-up in 6 months.    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 She expresses a desire to discuss the results with her neurologist, as she occasionally experiences sleep disturbances. She does not recall having an MRI in the past. She denies any possibility of pregnancy.    The patient reports the presence of bumps on her tongue, which have been present for several months. She was evaluated in 2024 for a COVID-19 and strep test, and the bumps are still present.    Supplemental Information   She just finished nursing school.      Review of Systems   Constitutional:  Negative for activity change and unexpected weight change.   HENT:  Negative for hearing loss, rhinorrhea and trouble swallowing.    Eyes:  Negative for discharge and visual disturbance.   Respiratory:  Negative for chest tightness, shortness of breath and wheezing.    Cardiovascular:  Negative for chest pain and palpitations.   Gastrointestinal:  Negative for blood in stool, diarrhea and vomiting.   Endocrine: Negative for polydipsia and polyuria.   Genitourinary:  Negative for difficulty urinating, dysuria and hematuria.  "  Musculoskeletal:  Negative for arthralgias, joint swelling and neck pain.   Neurological:  Negative for weakness.   Psychiatric/Behavioral:  Negative for confusion and dysphoric mood.         Objective:   /88 (BP Location: Left arm, Patient Position: Sitting, BP Method: Large (Manual))   Pulse 84   Temp 98.2 °F (36.8 °C) (Oral)   Ht 5' 1" (1.549 m)   Wt 79.1 kg (174 lb 6.1 oz)   LMP 04/30/2024 (Exact Date)   SpO2 98%   BMI 32.95 kg/m²     Physical Exam    Physical Exam  Vitals reviewed.   Constitutional:       General: She is not in acute distress.     Appearance: She is obese.   HENT:      Head: Normocephalic and atraumatic.      Right Ear: Ear canal and external ear normal.      Left Ear: Ear canal and external ear normal.      Nose: Nose normal.      Mouth/Throat:      Mouth: Mucous membranes are moist.      Pharynx: No oropharyngeal exudate or posterior oropharyngeal erythema.   Eyes:      Extraocular Movements: Extraocular movements intact.      Conjunctiva/sclera: Conjunctivae normal.      Pupils: Pupils are equal, round, and reactive to light.   Cardiovascular:      Rate and Rhythm: Normal rate and regular rhythm.      Pulses: Normal pulses.      Heart sounds: Normal heart sounds.   Pulmonary:      Effort: Pulmonary effort is normal. No respiratory distress.      Breath sounds: No wheezing or rales.   Abdominal:      General: Abdomen is flat. Bowel sounds are normal. There is no distension.      Palpations: Abdomen is soft.      Tenderness: There is no abdominal tenderness. There is no guarding.   Musculoskeletal:      Cervical back: Normal range of motion. No rigidity or tenderness.   Lymphadenopathy:      Cervical: No cervical adenopathy.   Skin:     General: Skin is warm.      Capillary Refill: Capillary refill takes less than 2 seconds.   Neurological:      Mental Status: She is alert and oriented to person, place, and time.      Cranial Nerves: No cranial nerve deficit.      Sensory: No " sensory deficit.   Psychiatric:         Mood and Affect: Mood normal.         Behavior: Behavior normal.            Assessment        ICD-10-CM ICD-9-CM   1. Hypertension, benign  I10 401.1   2. Mixed hyperlipidemia  E78.2 272.2   3. Pineal gland disorder  E34.8 259.8   4. Migraine without aura and without status migrainosus, not intractable  G43.009 346.10   5. Need for vaccination  Z23 V05.9         Plan:     Assessment & Plan  1. Hypertension.  The patient's blood pressure is well-regulated. The patient will maintain her current antihypertensive regimen.    2. Hyperlipidemia.  The patient will continue her current regimen of pravastatin.    3. Migraines.  The patient's migraines are well-managed with Maxalt. A prescription refill for Maxalt has been issued.    4. Abnormality of the pineal gland.  An MRI has been scheduled for 05/22/2024. A referral to a neurologist has been made.    Follow-up  The patient is scheduled for an annual visit in 11/2024.     ORDERS  1. Hypertension, benign  -     labetaloL (NORMODYNE) 100 MG tablet; Take 1 tablet (100 mg total) by mouth 2 (two) times daily.  Dispense: 180 tablet; Refill: 3  -     amLODIPine (NORVASC) 10 MG tablet; Take 1 tablet (10 mg total) by mouth once daily.  Dispense: 90 tablet; Refill: 3  -     Comprehensive Metabolic Panel; Future; Expected date: 11/07/2024  -     Lipid Panel; Future; Expected date: 11/07/2024  -     Hemoglobin A1C; Future; Expected date: 11/07/2024  -     CBC Without Differential; Future; Expected date: 11/07/2024    2. Mixed hyperlipidemia  -     pravastatin (PRAVACHOL) 20 MG tablet; Take 1 tablet (20 mg total) by mouth once daily.  Dispense: 90 tablet; Refill: 3  -     Comprehensive Metabolic Panel; Future; Expected date: 11/07/2024  -     Lipid Panel; Future; Expected date: 11/07/2024  -     Hemoglobin A1C; Future; Expected date: 11/07/2024  -     CBC Without Differential; Future; Expected date: 11/07/2024    3. Pineal gland  disorder  Overview:  05-  CT Head  No solid intracranial mass, but there is a 1.5 cm ovoid midline pineal region fluid focus which may be a pineal cyst.     Orders:  -     MRI Brain W WO Contrast; Future; Expected date: 05/07/2024  -     Ambulatory referral/consult to Neurology; Future; Expected date: 05/14/2024    4. Migraine without aura and without status migrainosus, not intractable  -     rizatriptan (MAXALT-MLT) 10 MG disintegrating tablet; Take 1 tablet (10 mg total) by mouth as needed for Migraine. May repeat in 2 hours if needed. Max 20mg/24hrs.  Dispense: 12 tablet; Refill: 11    5. Need for vaccination  -     diphth,pertus,acell,,tetanus (BOOSTRIX TDAP) 2.5-8-5 Lf-mcg-Lf/0.5mL Syrg injection; Inject 0.5 mLs into the muscle once. for 1 dose  Dispense: 0.5 mL; Refill: 0             -Rodger Conteh Jr., MD, AAHIVS      This note was generated with the assistance of ambient listening technology. Verbal consent was obtained by the patient and accompanying visitor(s) for the recording of patient appointment to facilitate this note. I attest to having reviewed and edited the generated note for accuracy, though some syntax or spelling errors may persist. Please contact the author of this note for any clarification.          There are no Patient Instructions on file for this visit.    Follow Up  Follow up in about 6 months (around 11/7/2024), or Annual (labs a week prior).    Future Appointments   Date Time Provider Department Center   5/22/2024  8:45 AM [facility] MRI1 LIMIT 350 LBS [facility] MRI [facility]   6/6/2024  8:00 AM Vinita Collins MD [facility] NEURO [facility]   10/31/2024  8:30 AM LAB, [facility] DRAW STATION [facility] LAB [facility]   11/7/2024  2:20 PM Rodger Conteh Jr., MD [facility]   11/26/2024 10:20 AM Rodger Conteh Jr., MD [facility]

## 2024-05-20 DIAGNOSIS — E34.8: Primary | ICD-10-CM

## 2024-05-22 ENCOUNTER — HOSPITAL ENCOUNTER (OUTPATIENT)
Dept: RADIOLOGY | Facility: HOSPITAL | Age: 29
Discharge: HOME | End: 2024-05-22
Attending: FAMILY MEDICINE
Payer: MEDICAID

## 2024-05-22 DIAGNOSIS — E34.8: ICD-10-CM

## 2024-05-22 PROCEDURE — A9585 GADOBUTROL INJECTION: HCPCS | Performed by: FAMILY MEDICINE

## 2024-05-22 PROCEDURE — 70553 MRI BRAIN STEM W/O & W/DYE: CPT | Mod: TC

## 2024-05-22 PROCEDURE — 70553 MRI BRAIN STEM W/O & W/DYE: CPT | Mod: 26,,, | Performed by: RADIOLOGY

## 2024-05-22 PROCEDURE — 25500020 PHARM REV CODE 255: Performed by: FAMILY MEDICINE

## 2024-05-22 RX ORDER — GADOBUTROL 604.72 MG/ML
8 INJECTION INTRAVENOUS
Status: COMPLETED | OUTPATIENT
Start: 2024-05-22 | End: 2024-05-22

## 2024-05-22 RX ADMIN — GADOBUTROL 8 ML: 604.72 INJECTION INTRAVENOUS at 09:05

## 2024-06-06 ENCOUNTER — OFFICE VISIT (OUTPATIENT)
Dept: NEUROLOGY | Facility: CLINIC | Age: 29
End: 2024-06-06
Payer: MEDICAID

## 2024-06-06 VITALS
WEIGHT: 173.31 LBS | DIASTOLIC BLOOD PRESSURE: 81 MMHG | HEIGHT: 61 IN | SYSTOLIC BLOOD PRESSURE: 127 MMHG | HEART RATE: 85 BPM | BODY MASS INDEX: 32.72 KG/M2

## 2024-06-06 DIAGNOSIS — G47.00 INSOMNIA, UNSPECIFIED TYPE: ICD-10-CM

## 2024-06-06 DIAGNOSIS — G43.009 MIGRAINE WITHOUT AURA AND WITHOUT STATUS MIGRAINOSUS, NOT INTRACTABLE: Primary | ICD-10-CM

## 2024-06-06 PROCEDURE — 99215 OFFICE O/P EST HI 40 MIN: CPT | Mod: S$PBB,,, | Performed by: STUDENT IN AN ORGANIZED HEALTH CARE EDUCATION/TRAINING PROGRAM

## 2024-06-06 PROCEDURE — 99999 PR PBB SHADOW E&M-EST. PATIENT-LVL III: CPT | Mod: PBBFAC,,, | Performed by: STUDENT IN AN ORGANIZED HEALTH CARE EDUCATION/TRAINING PROGRAM

## 2024-06-06 PROCEDURE — 3079F DIAST BP 80-89 MM HG: CPT | Mod: CPTII,,, | Performed by: STUDENT IN AN ORGANIZED HEALTH CARE EDUCATION/TRAINING PROGRAM

## 2024-06-06 PROCEDURE — 3008F BODY MASS INDEX DOCD: CPT | Mod: CPTII,,, | Performed by: STUDENT IN AN ORGANIZED HEALTH CARE EDUCATION/TRAINING PROGRAM

## 2024-06-06 PROCEDURE — 3074F SYST BP LT 130 MM HG: CPT | Mod: CPTII,,, | Performed by: STUDENT IN AN ORGANIZED HEALTH CARE EDUCATION/TRAINING PROGRAM

## 2024-06-06 PROCEDURE — 99213 OFFICE O/P EST LOW 20 MIN: CPT | Mod: PBBFAC | Performed by: STUDENT IN AN ORGANIZED HEALTH CARE EDUCATION/TRAINING PROGRAM

## 2024-06-06 PROCEDURE — 1159F MED LIST DOCD IN RCRD: CPT | Mod: CPTII,,, | Performed by: STUDENT IN AN ORGANIZED HEALTH CARE EDUCATION/TRAINING PROGRAM

## 2024-06-06 RX ORDER — RIZATRIPTAN BENZOATE 10 MG/1
10 TABLET, ORALLY DISINTEGRATING ORAL
Qty: 12 TABLET | Refills: 4 | Status: SHIPPED | OUTPATIENT
Start: 2024-06-06 | End: 2024-11-03

## 2024-06-06 NOTE — PROGRESS NOTES
Chief Complaint and Duration     Chronic migraines, here to establish care    History of Present Illness     Bailey Arita is a 28 y.o. female w hx of chronic migraines. New to me and here to establish care.   Currently on rizatriptan. 2-3 times a month now, prior 4-5 times. Pulsatile quality, sensitive to light and noise and can     Age of onset: 26  When did they become more of a problem: about 1 yr  # of Total headache days per month: 3-4  # of Migraine or severe days per month: 3-4  This rate has been present >3 months: yes  Intensity of average and most severe headaches: 5/10, 7/10  Duration of headache pain: >4 hrs  Quality of pain: Pulsating/Throbbing  Laterality: bilateral  Location: frontal, whole head  Photophobia and phonophobia: yes  Nausea and vomiting: no  Causes avoidance of physical activity: yes  Worsened by physical activity: no  Aura: no  Autonomic symptoms: no  Family Hx of migraines: none  Supplements: multi  Birth Control: alexei  Have qualities and features been stable for >2 years: NO  Has yearly eye exams, has one scheduled for 05/19/23     Preventive Medications failed: labetalol (>3 months, ineffective)  Acute medications failed: fioricet (ineffective)     OTC Medications: excedrin (<5 days per month)  Is medication overuse contributing to the patient's current migraine burden: no     Hx of (Bolded items are positive): depression, anxiety, fibromyalgia, autoimmune disorder, head trauma, neurological infection, glaucoma, asthma, nephrolithiasis, MI, Stroke, Raynaud's phen.     Current HA Regimen:  Labetalol  Rizatriptan    Review of patient's allergies indicates:  No Known Allergies  Current Outpatient Medications   Medication Sig Dispense Refill    amLODIPine (NORVASC) 10 MG tablet Take 1 tablet (10 mg total) by mouth once daily. 90 tablet 3    diclofenac sodium (VOLTAREN) 1 % Gel Apply 2 g topically 4 (four) times daily as needed. 50 g 0    labetaloL (NORMODYNE) 100 MG tablet Take 1  tablet (100 mg total) by mouth 2 (two) times daily. 180 tablet 3    levonorgestreL (RONAK) 14 mcg/24 hrs (3 yrs) 13.5 mg IUD 1 Intra Uterine Device by Intrauterine route. Placed by Dr. Christiane Rosado MD on 4/14/23      pravastatin (PRAVACHOL) 20 MG tablet Take 1 tablet (20 mg total) by mouth once daily. 90 tablet 3    triamcinolone acetonide 0.1% (KENALOG) 0.1 % ointment Apply topically 2 (two) times daily. To bilateral hands for up to 7 days for eczema flair up 80 g 5    rizatriptan (MAXALT-MLT) 10 MG disintegrating tablet Take 1 tablet (10 mg total) by mouth as needed for Migraine. May repeat in 2 hours if needed. Max 20mg/24hrs. 12 tablet 4     No current facility-administered medications for this visit.       Medical History     Past Medical History:   Diagnosis Date    Hypertension, benign 2/17/2023    Mixed hyperlipidemia 2/17/2023    Lab Results Component Value Date  CHOL 279 (H) 04/20/2023  CHOL 276 (H) 10/06/2022  CHOL 272 (H) 04/04/2022  Lab Results Component Value Date  HDL 95 (H) 04/20/2023  HDL 89 (H) 10/06/2022  HDL 85 (H) 04/04/2022  Lab Results Component Value Date  LDLCALC 170.2 (H) 04/20/2023  LDLCALC 163.2 (H) 10/06/2022  LDLCALC 168.8 (H) 04/04/2022  Lab Results Component Value Date  TRIG 69 04/20/2023  TRIG 119 1     Past Surgical History:   Procedure Laterality Date    APPENDECTOMY      BREAST SURGERY      KIDNEY SURGERY      Blocked tube     Family History   Problem Relation Name Age of Onset    Breast cancer Neg Hx      Colon cancer Neg Hx      Ovarian cancer Neg Hx       Social History     Socioeconomic History    Marital status: Single   Tobacco Use    Smoking status: Never    Smokeless tobacco: Never   Substance and Sexual Activity    Alcohol use: No    Drug use: No    Sexual activity: Never     Social Determinants of Health     Financial Resource Strain: Low Risk  (10/17/2022)    Received from [facility] [employer], [facility]    Overall Financial Resource Strain (CARDIA)     Difficulty of Paying  Living Expenses: Not hard at all   Food Insecurity: No Food Insecurity (10/17/2022)    Received from [facility] [employer]    Hunger Vital Sign     Worried About Running Out of Food in the Last Year: Never true     Ran Out of Food in the Last Year: Never true   Transportation Needs: No Transportation Needs (10/17/2022)    Received from [facility]    PRAPARE - Transportation     Lack of Transportation (Medical): No     Lack of Transportation (Non-Medical): No   Physical Activity: Inactive (10/17/2022)    Received from [facility]    Exercise Vital Sign     Days of Exercise per Week: 0 days     Minutes of Exercise per Session: 0 min   Stress: No Stress Concern Present (10/17/2022)    Received from [facility]    [facility] - Occupational Stress Questionnaire     Feeling of Stress : Not at all   Housing Stability: Low Risk  (10/17/2022)    Received from [facility]    Housing Stability Vital Sign     Unable to Pay for Housing in the Last Year: No     Number of Places Lived in the Last Year: 1     In the last 12 months, was there a time when you did not have a steady place to sleep or slept in a shelter (including now)?: No       Exam     Vitals:    06/06/24 0757   BP: 127/81   Pulse: 85      Physical Exam:  General: Not in acute distress. Not ill-appearing.   HENT: Normocephalic and atraumatic. Moist mucous membranes.  Eyes: Conjunctivae normal.   Pulmonary: Pulmonary effort is normal.   Abdominal: Abdomen is soft and flat.   Skin: Skin is warm and dry. No rashes.   Psychiatric: Mood normal.        Neurologic Exam   Mental status: oriented to person, place, and time  Attention: Normal. Concentration: normal.  Speech: speech is normal.  Cranial Nerves: PERRL, EOMI intact, V1-V3 Facial sensation intact. Symmetric facies. Hearing grossly intact. Palate and uvula midline, symmetric. No tongue deviation. Trapezius strength intact.      Motor exam: bulk and tone normal. Strength 5/5 in bilateral upper extremities: deltoids, biceps, triceps, wrist flexion/extension, finger abduction/adduction. Strength 5/5 in bilateral lower extremities: hip flexion/extension, thigh adduction/abduction, knee flexion/extension, dorsiflexion/plantarflexion, foot eversion/inversion.    Reflexes: 2+ in bilateral upper extremities: biceps and brachiaradialis, 2+ in bilateral lower extremities: patellar and achilles  Plantar reflex: normal  Saleh's/Clonus: negative    Sensory exam: light touch intact    Gait exam: normal  Romberg: negative  Coordination: normal    Tremor: none  Cogwheel rigidity: none    Labs and Imaging     Labs: reviewed  No results found for this or any previous visit (from the past 24 hour(s)).    LDL:  95    Imaging:   I have personally reviewed the images performed.   5/2024 - MRI brain w wo no acute intracranial processes    Assessment and Plan     Problem List Items Addressed This Visit          Neuro    Migraine without aura and without status migrainosus, not intractable - Primary (Chronic)    Relevant Medications    rizatriptan (MAXALT-MLT) 10 MG disintegrating tablet       Other    Insomnia     29 yo w hx of chronic migraines who is extremely pleasant, new to me and here to establish care. Was sitting in 4 to 5 times a month, now getting it 2-3.  On rizatriptan p.r.n..  Otherwise, patient head CT head possible pineal mass however MRI of the brain did not show it.  Migraines control but right now, patient recently graduated from nursing school.  Discussed multifactorial nature of headaches and migraines, Lifestyle modifications including diet and exercise were also discussed.    Patient also having episodes of insomnia, we will continue to monitor.  Also on propranolol for blood pressure but also may help for migraines as well.  Consider Elavil p.r.n. the patient still has a significant insomnia that may be contributing to her  migraines.      Follow-up:  3 months, okay for virtual, for migraine optimization    Time spent on this encounter: 45 minutes. This includes face to face time and non-face to face time preparing to see the patient (eg, review of tests), obtaining and/or reviewing separately obtained history, documenting clinical information in the electronic or other health record, independently interpreting results and communicating results to the patient/family/caregiver, or care coordinator.     This note was created by combination of typed  and M-Modal dictation. Transcription and phonetic errors may be present.  If there are any questions, please contact me.

## 2024-07-16 ENCOUNTER — HOSPITAL ENCOUNTER (EMERGENCY)
Facility: HOSPITAL | Age: 29
Discharge: HOME | End: 2024-07-16
Attending: INTERNAL MEDICINE
Payer: MEDICAID

## 2024-07-16 VITALS
HEART RATE: 92 BPM | HEIGHT: 61 IN | SYSTOLIC BLOOD PRESSURE: 123 MMHG | TEMPERATURE: 100 F | OXYGEN SATURATION: 100 % | WEIGHT: 180 LBS | BODY MASS INDEX: 33.99 KG/M2 | RESPIRATION RATE: 18 BRPM | DIASTOLIC BLOOD PRESSURE: 61 MMHG

## 2024-07-16 DIAGNOSIS — U07.1 COVID-19: ICD-10-CM

## 2024-07-16 DIAGNOSIS — G43.909 MIGRAINE WITHOUT STATUS MIGRAINOSUS, NOT INTRACTABLE, UNSPECIFIED MIGRAINE TYPE: Primary | ICD-10-CM

## 2024-07-16 DIAGNOSIS — U07.1 COVID-19 VIRUS DETECTED: ICD-10-CM

## 2024-07-16 LAB
B-HCG UR QL: NEGATIVE
CTP QC/QA: YES
CTP QC/QA: YES
INFLUENZA A ANTIGEN, POC: NEGATIVE
INFLUENZA B ANTIGEN, POC: NEGATIVE
SARS-COV-2 RDRP RESP QL NAA+PROBE: POSITIVE

## 2024-07-16 PROCEDURE — 96374 THER/PROPH/DIAG INJ IV PUSH: CPT | Mod: ER

## 2024-07-16 PROCEDURE — 87635 SARS-COV-2 COVID-19 AMP PRB: CPT | Mod: ER | Performed by: INTERNAL MEDICINE

## 2024-07-16 PROCEDURE — 87804 INFLUENZA ASSAY W/OPTIC: CPT | Mod: ER

## 2024-07-16 PROCEDURE — 25000003 PHARM REV CODE 250: Mod: ER | Performed by: INTERNAL MEDICINE

## 2024-07-16 PROCEDURE — 81025 URINE PREGNANCY TEST: CPT | Mod: ER | Performed by: INTERNAL MEDICINE

## 2024-07-16 PROCEDURE — 63600175 PHARM REV CODE 636 W HCPCS: Mod: ER | Performed by: INTERNAL MEDICINE

## 2024-07-16 PROCEDURE — 96361 HYDRATE IV INFUSION ADD-ON: CPT | Mod: ER

## 2024-07-16 PROCEDURE — 99284 EMERGENCY DEPT VISIT MOD MDM: CPT | Mod: 25,ER

## 2024-07-16 PROCEDURE — 81025 URINE PREGNANCY TEST: CPT | Mod: ER

## 2024-07-16 PROCEDURE — 96375 TX/PRO/DX INJ NEW DRUG ADDON: CPT | Mod: ER

## 2024-07-16 RX ORDER — IBUPROFEN 800 MG/1
800 TABLET ORAL EVERY 8 HOURS PRN
Qty: 30 TABLET | Refills: 0 | Status: SHIPPED | OUTPATIENT
Start: 2024-07-16

## 2024-07-16 RX ORDER — PROCHLORPERAZINE EDISYLATE 5 MG/ML
2.5 INJECTION INTRAMUSCULAR; INTRAVENOUS
Status: COMPLETED | OUTPATIENT
Start: 2024-07-16 | End: 2024-07-16

## 2024-07-16 RX ORDER — ONDANSETRON HYDROCHLORIDE 2 MG/ML
4 INJECTION, SOLUTION INTRAVENOUS
Status: DISCONTINUED | OUTPATIENT
Start: 2024-07-16 | End: 2024-07-16

## 2024-07-16 RX ORDER — AZELASTINE 1 MG/ML
2 SPRAY, METERED NASAL 2 TIMES DAILY
Qty: 30 ML | Refills: 0 | Status: SHIPPED | OUTPATIENT
Start: 2024-07-16 | End: 2024-09-09

## 2024-07-16 RX ORDER — FLUTICASONE PROPIONATE 50 MCG
2 SPRAY, SUSPENSION (ML) NASAL DAILY
Qty: 15 G | Refills: 0 | Status: SHIPPED | OUTPATIENT
Start: 2024-07-16

## 2024-07-16 RX ORDER — ACETAMINOPHEN 325 MG/1
650 TABLET ORAL
Status: COMPLETED | OUTPATIENT
Start: 2024-07-16 | End: 2024-07-16

## 2024-07-16 RX ORDER — KETOROLAC TROMETHAMINE 30 MG/ML
30 INJECTION, SOLUTION INTRAMUSCULAR; INTRAVENOUS
Status: COMPLETED | OUTPATIENT
Start: 2024-07-16 | End: 2024-07-16

## 2024-07-16 RX ADMIN — KETOROLAC TROMETHAMINE 30 MG: 30 INJECTION, SOLUTION INTRAMUSCULAR at 02:07

## 2024-07-16 RX ADMIN — PROCHLORPERAZINE EDISYLATE 2.5 MG: 5 INJECTION INTRAMUSCULAR; INTRAVENOUS at 03:07

## 2024-07-16 RX ADMIN — ACETAMINOPHEN 650 MG: 325 TABLET ORAL at 02:07

## 2024-07-16 RX ADMIN — SODIUM CHLORIDE 1000 ML: 9 INJECTION, SOLUTION INTRAVENOUS at 02:07

## 2024-07-16 NOTE — ED TRIAGE NOTES
Bailey Arita, a 28 y.o. female presents to the ED w/ complaint of headache since 10pm and thought it was a migraine, she took he migraine meds with no relief but has a fever in triage.  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  + photophobia denies nausea or vomiting.     Triage note:  Chief Complaint   Patient presents with    Headache     Reports headache since 10pm. Hx of migraine and took normal meds w/out relief. Denies n/v. Does reports sinus congestion/pressure and took benadryl.      Review of patient's allergies indicates:  No Known Allergies  Past Medical History:   Diagnosis Date    Hypertension, benign 2/17/2023    Mixed hyperlipidemia 2/17/2023    Lab Results Component Value Date  CHOL 279 (H) 04/20/2023  CHOL 276 (H) 10/06/2022  CHOL 272 (H) 04/04/2022  Lab Results Component Value Date  HDL 95 (H) 04/20/2023  HDL 89 (H) 10/06/2022  HDL 85 (H) 04/04/2022  Lab Results Component Value Date  LDLCALC 170.2 (H) 04/20/2023  LDLCALC 163.2 (H) 10/06/2022  LDLCALC 168.8 (H) 04/04/2022  Lab Results Component Value Date  TRIG 69 04/20/2023  TRIG 119 1

## 2024-07-16 NOTE — ED PROVIDER NOTES
Encounter Date: 7/16/2024       History     Chief Complaint   Patient presents with    Headache     Reports headache since 10pm. Hx of migraine and took normal meds w/out relief. Denies n/v. Does reports sinus congestion/pressure and took benadryl.      28-year-old female with a past medical history significant for migraines, hyperlipidemia and hypertension presents to emergency department complaining of frontal headache since 10:00 p.m..  Patient states she took her migraine medication at home without relief.  She also endorses subjective fever, cough, nasal congestion and sore throat.  She denies chest pain/shortness of breath/vomiting/diarrhea.    The history is provided by the patient. No  was used.     Review of patient's allergies indicates:  No Known Allergies  Past Medical History:   Diagnosis Date    Hypertension, benign 2/17/2023    Mixed hyperlipidemia 2/17/2023    Lab Results Component Value Date  CHOL 279 (H) 04/20/2023  CHOL 276 (H) 10/06/2022  CHOL 272 (H) 04/04/2022  Lab Results Component Value Date  HDL 95 (H) 04/20/2023  HDL 89 (H) 10/06/2022  HDL 85 (H) 04/04/2022  Lab Results Component Value Date  LDLCALC 170.2 (H) 04/20/2023  LDLCALC 163.2 (H) 10/06/2022  LDLCALC 168.8 (H) 04/04/2022  Lab Results Component Value Date  TRIG 69 04/20/2023  TRIG 119 1     Past Surgical History:   Procedure Laterality Date    APPENDECTOMY      BREAST SURGERY      KIDNEY SURGERY      Blocked tube     Family History   Problem Relation Name Age of Onset    Breast cancer Neg Hx      Colon cancer Neg Hx      Ovarian cancer Neg Hx       Social History     Tobacco Use    Smoking status: Never    Smokeless tobacco: Never   Substance Use Topics    Alcohol use: No    Drug use: No     Review of Systems   Constitutional:  Positive for fever.   HENT:  Positive for congestion and sore throat.    Respiratory:  Positive for cough. Negative for shortness of breath.    Cardiovascular:  Negative for chest pain and  palpitations.   Gastrointestinal:  Negative for abdominal pain, diarrhea and vomiting.   All other systems reviewed and are negative.      Physical Exam     Initial Vitals [07/16/24 0215]   BP Pulse Resp Temp SpO2   (!) 149/99 (!) 122 16 (!) 101.9 °F (38.8 °C) 97 %      MAP       --         Physical Exam    Nursing note and vitals reviewed.  Constitutional: She is not diaphoretic. No distress.   HENT:   Head: Normocephalic and atraumatic.   Right Ear: External ear normal.   Left Ear: External ear normal.   Eyes: Conjunctivae are normal.   Neck:   Normal range of motion.  Cardiovascular:  Normal rate and regular rhythm.           Pulmonary/Chest: Breath sounds normal. No respiratory distress.   Abdominal: Abdomen is soft. Bowel sounds are normal. There is no abdominal tenderness.   Musculoskeletal:         General: Normal range of motion.      Cervical back: Normal range of motion.     Neurological: She is alert. She has normal strength. No cranial nerve deficit.   Skin: Skin is warm and dry. Capillary refill takes less than 2 seconds.   Psychiatric: She has a normal mood and affect. Thought content normal.         ED Course   Procedures  Labs Reviewed   SARS-COV-2 RDRP GENE - Abnormal; Notable for the following components:       Result Value    POC Rapid COVID Positive (*)     All other components within normal limits    Narrative:     This test utilizes isothermal nucleic acid amplification technology to detect the SARS-CoV-2 RdRp nucleic acid segment. The analytical sensitivity (limit of detection) is 500 copies/swab.     A POSITIVE result is indicative of the presence of SARS-CoV-2 RNA; clinical correlation with patient history and other diagnostic information is necessary to determine patient infection status.    A NEGATIVE result means that SARS-CoV-2 nucleic acids are not present above the limit of detection. A NEGATIVE result should be treated as presumptive. It does not rule out the possibility of COVID-19  and should not be the sole basis for treatment decisions. If COVID-19 is strongly suspected based on clinical and exposure history, re-testing using an alternate molecular assay should be considered.     Commercial kits are provided by [employer].   _________________________________________________________________   The authorized Fact Sheet for Healthcare Providers and the authorized Fact Sheet for Patients of the ID NOW COVID-19 are available on the FDA website:    https://www.fda.gov/media/209510/download      https://www.fda.gov/media/802653/download       POCT URINE PREGNANCY   POCT RAPID INFLUENZA A/B          Imaging Results    None          Medications   acetaminophen tablet 650 mg (650 mg Oral Given 7/16/24 0230)   sodium chloride 0.9% bolus 1,000 mL 1,000 mL (0 mLs Intravenous Stopped 7/16/24 0356)   ketorolac injection 30 mg (30 mg Intravenous Given 7/16/24 0259)   prochlorperazine injection Soln 2.5 mg (2.5 mg Intravenous Given 7/16/24 0300)     Medical Decision Making  28-year-old female with a past medical history significant for migraines, hyperlipidemia and hypertension presents to emergency department complaining of frontal headache since 10:00 p.m..  Patient states she took her migraine medication at home without relief.  She also endorses subjective fever, cough, nasal congestion and sore throat.  She denies chest pain/shortness of breath/vomiting/diarrhea.  Course of ED stay:   Patient received Tylenol for initial fever (101.9).  COVID screen was positive.  Patient received normal saline bolus, Toradol and Compazine ED.  Symptoms improved after medications.  Patient was given instructions for COVID-19 and migraine.  She was advised to follow-up with her primary care physician within the next week for re-evaluation/return to the emergency department if condition worsens.    Amount and/or Complexity of Data Reviewed  Labs: ordered.    Risk  OTC drugs.  Prescription drug management.                                       Clinical Impression:  Final diagnoses:  [G43.909] Migraine without status migrainosus, not intractable, unspecified migraine type (Primary)  [U07.1] COVID-19          ED Disposition Condition    Discharge Stable          ED Prescriptions       Medication Sig Dispense Start Date End Date Auth. Provider    azelastine (ASTELIN) 137 mcg (0.1 %) nasal spray 2 sprays (274 mcg total) by Nasal route 2 (two) times daily. 30 mL 7/16/2024 9/9/2024 Major Villarreal MD    fluticasone propionate (FLONASE) 50 mcg/actuation nasal spray 2 sprays (100 mcg total) by Each Nostril route once daily. 15 g 7/16/2024 -- Major Villarreal MD    ibuprofen (ADVIL,MOTRIN) 800 MG tablet Take 1 tablet (800 mg total) by mouth every 8 (eight) hours as needed for Pain. 30 tablet 7/16/2024 -- Major Villarreal MD          Follow-up Information       Follow up With Specialties Details Why Contact Info    Rodger Conteh Jr., MD Family Medicine Schedule an appointment as soon as possible for a visit in 3 days For reevaluation [address] [facility] [address]  [phone]               Major Villarreal MD  07/17/24 0922

## 2024-07-16 NOTE — Clinical Note
"Bailey"Federico Arita was seen and treated in our emergency department on 7/16/2024.     COVID-19 is present in our communities across the state. There is limited testing for COVID at this time, so not all patients can be tested. In this situation, your employee meets the following criteria:    Bailey Arita has met the criteria for COVID-19 testing and has a POSITIVE result. She can return to work once they are asymptomatic for 24 hours without the use of fever reducing medications AND at least five days from the first positive result. A mask is recommended for 5 days post quarantine.     If you have any questions or concerns, or if I can be of further assistance, please do not hesitate to contact me.    Sincerely,             Helen Rivera RN"

## 2024-09-06 ENCOUNTER — OFFICE VISIT (OUTPATIENT)
Dept: NEUROLOGY | Facility: CLINIC | Age: 29
End: 2024-09-06
Payer: COMMERCIAL

## 2024-09-06 DIAGNOSIS — G47.00 INSOMNIA, UNSPECIFIED TYPE: ICD-10-CM

## 2024-09-06 DIAGNOSIS — G43.009 MIGRAINE WITHOUT AURA AND WITHOUT STATUS MIGRAINOSUS, NOT INTRACTABLE: Primary | ICD-10-CM

## 2024-09-06 RX ORDER — RIZATRIPTAN BENZOATE 10 MG/1
10 TABLET, ORALLY DISINTEGRATING ORAL
Qty: 12 TABLET | Refills: 5 | Status: SHIPPED | OUTPATIENT
Start: 2024-09-06 | End: 2025-03-05

## 2024-09-06 NOTE — PROGRESS NOTES
The patient location is: [place]  The chief complaint leading to consultation is: Headache    Visit type: audiovisual    Each patient to whom he or she provides medical services by telemedicine is:  (1) informed of the relationship between the physician and patient and the respective role of any other health care provider with respect to management of the patient; and (2) notified that he or she may decline to receive medical services by telemedicine and may withdraw from such care at any time.    Chief Complaint      Chronic migraines    History of Present Illness     Bailey Arita is a 28 y.o. female w hx of chronic migraines. New to me and here to establish care.   Currently on rizatriptan. 2-3 times a month now, prior 4-5 times. Pulsatile quality, sensitive to light and noise and can     Age of onset: 26  When did they become more of a problem: about 1 yr  # of Total headache days per month: 3-4  # of Migraine or severe days per month: 3-4  This rate has been present >3 months: yes  Intensity of average and most severe headaches: 5/10, 7/10  Duration of headache pain: >4 hrs  Quality of pain: Pulsating/Throbbing  Laterality: bilateral  Location: frontal, whole head  Photophobia and phonophobia: yes  Nausea and vomiting: no  Causes avoidance of physical activity: yes  Worsened by physical activity: no  Aura: no  Autonomic symptoms: no  Family Hx of migraines: none  Supplements: multi  Birth Control: alexei  Have qualities and features been stable for >2 years: NO  Has yearly eye exams, has one scheduled for 05/19/23     Preventive Medications failed: labetalol (>3 months, ineffective)  Acute medications failed: fioricet (ineffective)     OTC Medications: excedrin (<5 days per month)  Is medication overuse contributing to the patient's current migraine burden: no     Hx of (Bolded items are positive): depression, anxiety, fibromyalgia, autoimmune disorder, head trauma, neurological infection, glaucoma, asthma,  nephrolithiasis, MI, Stroke, Raynaud's phen.     Current HA Regimen:  Labetalol  Rizatriptan    Interim:  9/6/24 - doing well. 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     Review of patient's allergies indicates:  No Known Allergies  Current Outpatient Medications   Medication Sig Dispense Refill    amLODIPine (NORVASC) 10 MG tablet Take 1 tablet (10 mg total) by mouth once daily. 90 tablet 3    azelastine (ASTELIN) 137 mcg (0.1 %) nasal spray 2 sprays (274 mcg total) by Nasal route 2 (two) times daily. 30 mL 0    diclofenac sodium (VOLTAREN) 1 % Gel Apply 2 g topically 4 (four) times daily as needed. 50 g 0    fluticasone propionate (FLONASE) 50 mcg/actuation nasal spray 2 sprays (100 mcg total) by Each Nostril route once daily. 15 g 0    ibuprofen (ADVIL,MOTRIN) 800 MG tablet Take 1 tablet (800 mg total) by mouth every 8 (eight) hours as needed for Pain. 30 tablet 0    labetaloL (NORMODYNE) 100 MG tablet Take 1 tablet (100 mg total) by mouth 2 (two) times daily. 180 tablet 3    levonorgestreL (RONAK) 14 mcg/24 hrs (3 yrs) 13.5 mg IUD 1 Intra Uterine Device by Intrauterine route. Placed by Dr. Christiane Rosado MD on 4/14/23      pravastatin (PRAVACHOL) 20 MG tablet Take 1 tablet (20 mg total) by mouth once daily. 90 tablet 3    rizatriptan (MAXALT-MLT) 10 MG disintegrating tablet Take 1 tablet (10 mg total) by mouth as needed for Migraine. May repeat in 2 hours if needed. Max 20mg/24hrs. 12 tablet 5    triamcinolone acetonide 0.1% (KENALOG) 0.1 % ointment Apply topically 2 (two) times daily. To bilateral hands for up to 7 days for eczema flair up 80 g 5     No current facility-administered medications for this visit.       Medical History     Past Medical History:   Diagnosis Date    Hypertension, benign 2/17/2023    Mixed hyperlipidemia 2/17/2023    Lab Results Component Value Date  CHOL 279 (H) 04/20/2023  CHOL 276 (H) 10/06/2022  CHOL 272 (H) 04/04/2022  Lab Results  Component Value Date  HDL 95 (H) 04/20/2023  HDL 89 (H) 10/06/2022  HDL 85 (H) 04/04/2022  Lab Results Component Value Date  LDLCALC 170.2 (H) 04/20/2023  LDLCALC 163.2 (H) 10/06/2022  LDLCALC 168.8 (H) 04/04/2022  Lab Results Component Value Date  TRIG 69 04/20/2023  TRIG 119 1     Past Surgical History:   Procedure Laterality Date    APPENDECTOMY      BREAST SURGERY      KIDNEY SURGERY      Blocked tube     Family History   Problem Relation Name Age of Onset    Breast cancer Neg Hx      Colon cancer Neg Hx      Ovarian cancer Neg Hx       Social History     Socioeconomic History    Marital status: Single   Tobacco Use    Smoking status: Never    Smokeless tobacco: Never   Substance and Sexual Activity    Alcohol use: No    Drug use: No    Sexual activity: Never     Social Determinants of Health     Financial Resource Strain: Low Risk  (9/6/2024)    Overall Financial Resource Strain (CARDIA)     Difficulty of Paying Living Expenses: Not hard at all   Food Insecurity: No Food Insecurity (9/6/2024)    Hunger Vital Sign     Worried About Running Out of Food in the Last Year: Never true     Ran Out of Food in the Last Year: Never true   Transportation Needs: No Transportation Needs (10/17/2022)    Received from [facility]    PRAPARE - Transportation     Lack of Transportation (Medical): No     Lack of Transportation (Non-Medical): No   Physical Activity: Inactive (9/6/2024)    Exercise Vital Sign     Days of Exercise per Week: 1 day     Minutes of Exercise per Session: 0 min   Stress: No Stress Concern Present (9/6/2024)    [employer] - Occupational Stress Questionnaire     Feeling of Stress : Only a little   Housing Stability: Unknown (9/6/2024)    Housing Stability Vital Sign     Unable to Pay for Housing in the Last Year: Patient declined       Exam     There were no vitals filed for this visit.     Physical Exam:  General: Not in acute distress. Not ill-appearing.    Psychiatric: Mood normal.        Neurologic Exam   Mental status: oriented to person, place, and time  Attention: Normal. Concentration: normal.  Speech: speech is normal.  Cranial Nerves: Symmetric facies.     Motor exam: moving extremities symmetrically      Labs and Imaging     Labs: reviewed  No results found for this or any previous visit (from the past 24 hour(s)).    LDL:  95    Imaging:   I have personally reviewed the images performed.   5/2024 - MRI brain w wo no acute intracranial processes    Assessment and Plan     Problem List Items Addressed This Visit          Neuro    Migraine without aura and without status migrainosus, not intractable - Primary (Chronic)    Relevant Medications    rizatriptan (MAXALT-MLT) 10 MG disintegrating tablet       Other    Insomnia     27 yo w hx of chronic migraines who is extremely pleasant, new to me and here to establish care. Was sitting in 4 to 5 times a month, now getting it 2-3.  On rizatriptan p.r.n..  Otherwise, patient head CT head possible pineal mass however MRI of the brain did not show it.  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  Discussed multifactorial nature of headaches and migraines, Lifestyle modifications including diet and exercise were also discussed.    Patient also having episodes of insomnia, we will continue to monitor. Takes melatonin to help. Discussed can try magnesium 300-400mg daily which has been well studied for migraine prevention.  Needs to take consistently. Avoid if having diarrhea or GI problems.    Also on labetolol for blood pressure but also may help for migraines as well.  Consider Elavil p.r.n. the patient still has a significant insomnia that may be contributing to her migraines. Sleep is doing okay at this time and adjusting to her work schedule.     Follow-up:  6 months, okay for virtual, for migraine optimization. Patient also to discuss w  pcp if take over triptan prescription as well managed, discussed w patient okay either way followup w me or PCP. Doing well.    This note was created by combination of typed  and M-Modal dictation. Transcription and phonetic errors may be present.  If there are any questions, please contact me.

## 2024-09-09 DIAGNOSIS — E78.2 MIXED HYPERLIPIDEMIA: ICD-10-CM

## 2024-09-09 NOTE — TELEPHONE ENCOUNTER
Care Due:                  Date            Visit Type   Department     Provider  --------------------------------------------------------------------------------                                [facility] FAM[facility]                              PRIMARY      MEDICINE/  Last Visit: 05-      CARE ([facility])   INTERNAL PALMA Conteh                              [facility]                              PRIMARY      MEDICINE/  Next Visit: 11-      CARE ([facility])   INTERNAL MED   Rodger Conteh                                                            Last  Test          Frequency    Reason                     Performed    Due Date  --------------------------------------------------------------------------------    CMP.........  12 months..  pravastatin..............  10-   10-    Lipid Panel.  12 months..  pravastatin..............  10-   10-    [facility] Embedded Care Due Messages. Reference number: [id number].   9/09/2024 3:04:03 PM CDT

## 2024-09-10 RX ORDER — PRAVASTATIN SODIUM 20 MG/1
20 TABLET ORAL DAILY
Qty: 90 TABLET | Refills: 0 | Status: SHIPPED | OUTPATIENT
Start: 2024-09-10

## 2024-09-10 NOTE — TELEPHONE ENCOUNTER
Refill Decision Note   Bailey Juan J  is requesting a refill authorization.  Brief Assessment and Rationale for Refill:  Approve     Medication Therapy Plan:  flos    Medication Reconciliation Completed: No   Comments:     No Care Gaps recommended.     Note composed:11:57 AM 09/10/2024

## 2024-10-18 DIAGNOSIS — E78.2 MIXED HYPERLIPIDEMIA: ICD-10-CM

## 2024-10-18 DIAGNOSIS — I10 HYPERTENSION, BENIGN: ICD-10-CM

## 2024-10-24 ENCOUNTER — PATIENT MESSAGE (OUTPATIENT)
Dept: FAMILY MEDICINE | Facility: CLINIC | Age: 29
End: 2024-10-24
Payer: COMMERCIAL

## 2024-10-27 RX ORDER — PRAVASTATIN SODIUM 20 MG/1
20 TABLET ORAL DAILY
Qty: 90 TABLET | Refills: 0 | Status: SHIPPED | OUTPATIENT
Start: 2024-10-27

## 2024-10-27 RX ORDER — LABETALOL 100 MG/1
100 TABLET, FILM COATED ORAL 2 TIMES DAILY
Qty: 180 TABLET | Refills: 3 | Status: SHIPPED | OUTPATIENT
Start: 2024-10-27 | End: 2024-10-28 | Stop reason: SDUPTHER

## 2024-10-31 ENCOUNTER — LAB VISIT (OUTPATIENT)
Dept: LAB | Facility: HOSPITAL | Age: 29
End: 2024-10-31
Attending: FAMILY MEDICINE
Payer: COMMERCIAL

## 2024-10-31 DIAGNOSIS — I10 HYPERTENSION, BENIGN: Chronic | ICD-10-CM

## 2024-10-31 DIAGNOSIS — E78.2 MIXED HYPERLIPIDEMIA: Chronic | ICD-10-CM

## 2024-10-31 LAB
ALBUMIN SERPL BCP-MCNC: 4.2 G/DL (ref 3.5–5.2)
ALP SERPL-CCNC: 77 U/L (ref 40–150)
ALT SERPL W/O P-5'-P-CCNC: 13 U/L (ref 10–44)
ANION GAP SERPL CALC-SCNC: 10 MMOL/L (ref 8–16)
AST SERPL-CCNC: 23 U/L (ref 10–40)
BILIRUB SERPL-MCNC: 0.3 MG/DL (ref 0.1–1)
BUN SERPL-MCNC: 11 MG/DL (ref 6–20)
CALCIUM SERPL-MCNC: 9.8 MG/DL (ref 8.7–10.5)
CHLORIDE SERPL-SCNC: 106 MMOL/L (ref 95–110)
CHOLEST SERPL-MCNC: 178 MG/DL (ref 120–199)
CHOLEST/HDLC SERPL: 2.4 {RATIO} (ref 2–5)
CO2 SERPL-SCNC: 26 MMOL/L (ref 23–29)
CREAT SERPL-MCNC: 1 MG/DL (ref 0.5–1.4)
ERYTHROCYTE [DISTWIDTH] IN BLOOD BY AUTOMATED COUNT: 11.2 % (ref 11.5–14.5)
EST. GFR  (NO RACE VARIABLE): >60 ML/MIN/1.73 M^2
ESTIMATED AVG GLUCOSE: 103 MG/DL (ref 68–131)
GLUCOSE SERPL-MCNC: 82 MG/DL (ref 70–110)
HBA1C MFR BLD: 5.2 % (ref 4–5.6)
HCT VFR BLD AUTO: 39.9 % (ref 37–48.5)
HDLC SERPL-MCNC: 74 MG/DL (ref 40–75)
HDLC SERPL: 41.6 % (ref 20–50)
HGB BLD-MCNC: 13.4 G/DL (ref 12–16)
LDLC SERPL CALC-MCNC: 91 MG/DL (ref 63–159)
MCH RBC QN AUTO: 30.4 PG (ref 27–31)
MCHC RBC AUTO-ENTMCNC: 33.6 G/DL (ref 32–36)
MCV RBC AUTO: 91 FL (ref 82–98)
NONHDLC SERPL-MCNC: 104 MG/DL
PLATELET # BLD AUTO: 244 K/UL (ref 150–450)
PMV BLD AUTO: 10.8 FL (ref 9.2–12.9)
POTASSIUM SERPL-SCNC: 3.9 MMOL/L (ref 3.5–5.1)
PROT SERPL-MCNC: 7.2 G/DL (ref 6–8.4)
RBC # BLD AUTO: 4.41 M/UL (ref 4–5.4)
SODIUM SERPL-SCNC: 142 MMOL/L (ref 136–145)
TRIGL SERPL-MCNC: 65 MG/DL (ref 30–150)
WBC # BLD AUTO: 7.82 K/UL (ref 3.9–12.7)

## 2024-10-31 PROCEDURE — 85027 COMPLETE CBC AUTOMATED: CPT | Performed by: FAMILY MEDICINE

## 2024-10-31 PROCEDURE — 80061 LIPID PANEL: CPT | Performed by: FAMILY MEDICINE

## 2024-10-31 PROCEDURE — 83036 HEMOGLOBIN GLYCOSYLATED A1C: CPT | Performed by: FAMILY MEDICINE

## 2024-10-31 PROCEDURE — 80053 COMPREHEN METABOLIC PANEL: CPT | Performed by: FAMILY MEDICINE

## 2024-10-31 PROCEDURE — 36415 COLL VENOUS BLD VENIPUNCTURE: CPT | Mod: PN | Performed by: FAMILY MEDICINE

## 2024-11-01 DIAGNOSIS — I10 HYPERTENSION, BENIGN: ICD-10-CM

## 2024-11-01 NOTE — TELEPHONE ENCOUNTER
No care due was identified.  [facility] Embedded Care Due Messages. Reference number: [id number].   11/01/2024 9:01:41 AM CDT

## 2024-11-03 RX ORDER — LABETALOL 100 MG/1
100 TABLET, FILM COATED ORAL 2 TIMES DAILY
Qty: 180 TABLET | Refills: 1 | Status: SHIPPED | OUTPATIENT
Start: 2024-11-03 | End: 2024-11-07 | Stop reason: SDUPTHER

## 2024-11-07 ENCOUNTER — OFFICE VISIT (OUTPATIENT)
Dept: FAMILY MEDICINE | Facility: CLINIC | Age: 29
End: 2024-11-07
Payer: COMMERCIAL

## 2024-11-07 ENCOUNTER — PATIENT MESSAGE (OUTPATIENT)
Dept: FAMILY MEDICINE | Facility: CLINIC | Age: 29
End: 2024-11-07

## 2024-11-07 VITALS
OXYGEN SATURATION: 97 % | WEIGHT: 163.38 LBS | HEIGHT: 61 IN | SYSTOLIC BLOOD PRESSURE: 120 MMHG | RESPIRATION RATE: 19 BRPM | DIASTOLIC BLOOD PRESSURE: 88 MMHG | BODY MASS INDEX: 30.85 KG/M2 | HEART RATE: 97 BPM

## 2024-11-07 DIAGNOSIS — I10 HYPERTENSION, BENIGN: ICD-10-CM

## 2024-11-07 DIAGNOSIS — E78.2 MIXED HYPERLIPIDEMIA: ICD-10-CM

## 2024-11-07 DIAGNOSIS — Z00.00 GENERAL MEDICAL EXAM: Primary | ICD-10-CM

## 2024-11-07 DIAGNOSIS — G43.009 MIGRAINE WITHOUT AURA AND WITHOUT STATUS MIGRAINOSUS, NOT INTRACTABLE: ICD-10-CM

## 2024-11-07 DIAGNOSIS — Z23 NEED FOR VACCINATION: ICD-10-CM

## 2024-11-07 PROBLEM — N94.6 DYSMENORRHEA: Status: RESOLVED | Noted: 2023-02-17 | Resolved: 2024-11-07

## 2024-11-07 PROBLEM — U07.1 COVID-19: Status: RESOLVED | Noted: 2024-07-16 | Resolved: 2024-11-07

## 2024-11-07 PROBLEM — D50.0 IRON DEFICIENCY ANEMIA DUE TO CHRONIC BLOOD LOSS: Status: RESOLVED | Noted: 2023-02-17 | Resolved: 2024-11-07

## 2024-11-07 PROCEDURE — 99395 PREV VISIT EST AGE 18-39: CPT | Mod: S$GLB,,, | Performed by: FAMILY MEDICINE

## 2024-11-07 PROCEDURE — 3074F SYST BP LT 130 MM HG: CPT | Mod: CPTII,S$GLB,, | Performed by: FAMILY MEDICINE

## 2024-11-07 PROCEDURE — 3079F DIAST BP 80-89 MM HG: CPT | Mod: CPTII,S$GLB,, | Performed by: FAMILY MEDICINE

## 2024-11-07 PROCEDURE — 99999 PR PBB SHADOW E&M-EST. PATIENT-LVL IV: CPT | Mod: PBBFAC,,, | Performed by: FAMILY MEDICINE

## 2024-11-07 PROCEDURE — 3008F BODY MASS INDEX DOCD: CPT | Mod: CPTII,S$GLB,, | Performed by: FAMILY MEDICINE

## 2024-11-07 PROCEDURE — 3044F HG A1C LEVEL LT 7.0%: CPT | Mod: CPTII,S$GLB,, | Performed by: FAMILY MEDICINE

## 2024-11-07 PROCEDURE — 1159F MED LIST DOCD IN RCRD: CPT | Mod: CPTII,S$GLB,, | Performed by: FAMILY MEDICINE

## 2024-11-07 PROCEDURE — 1160F RVW MEDS BY RX/DR IN RCRD: CPT | Mod: CPTII,S$GLB,, | Performed by: FAMILY MEDICINE

## 2024-11-07 RX ORDER — PRAVASTATIN SODIUM 20 MG/1
20 TABLET ORAL DAILY
Qty: 90 TABLET | Refills: 3 | Status: SHIPPED | OUTPATIENT
Start: 2024-11-07

## 2024-11-07 RX ORDER — LABETALOL 100 MG/1
100 TABLET, FILM COATED ORAL 2 TIMES DAILY
Qty: 180 TABLET | Refills: 3 | Status: SHIPPED | OUTPATIENT
Start: 2024-11-07

## 2024-11-07 RX ORDER — AMLODIPINE BESYLATE 10 MG/1
10 TABLET ORAL DAILY
Qty: 90 TABLET | Refills: 3 | Status: SHIPPED | OUTPATIENT
Start: 2024-11-07

## 2024-11-07 RX ORDER — RIZATRIPTAN BENZOATE 10 MG/1
10 TABLET, ORALLY DISINTEGRATING ORAL
Qty: 12 TABLET | Refills: 5 | Status: SHIPPED | OUTPATIENT
Start: 2024-11-07 | End: 2025-05-06

## 2024-11-07 NOTE — PATIENT INSTRUCTIONS
Magnesium citrate  This form of magnesium is good for digestive issues, constipation, and muscle soreness. It's absorbed well, especially in liquid form. Magnesium citrate works by causing the intestines to release water into stool, which can help with regularity. However, it can have a laxative effect, which may lead to dehydration and diarrhea if taken in high doses or frequently.    Magnesium glycinate  This form of magnesium is good for calming the nervous system, anxiety, insomnia, and muscle recovery. It's easily absorbed and has a gentle effect on the digestive system, making it a good choice for people with sensitive stomachs. Research suggests that magnesium glycinate can improve sleep quality, reduce anxiety, and optimize muscle performance.

## 2024-11-18 NOTE — PROGRESS NOTES
Patient Name: Bailey Arita    : 1995  MRN: [id number]      Subjective:     Patient ID: Bailey is a 29 y.o. female    Chief Complaint:  Annual Exam    History of Present Illness    Bailey presents today for an annual check-up.    Current medications include Amlodipine 5 mg, Labetalol (refill needed), Pravastatin 11 mg, Maxalt for migraines (refill needed), Triamcinolone ointment for eczema flares, and occasional Ibuprofen. She has discontinued Astelin nasal spray, Voltaren gel, and Flonase nasal spray. She uses [facility] due to convenient hours and weekend availability. She reports an issue with her Labetalol prescription refill and mentions a change in insurance coverage affecting her pharmacy options.    She has a Apryl IUD in place, reporting significantly lighter and regular periods since insertion. Her gynecologist is Dr. Hernandez through [facility].    She has not been checking her blood pressure at home recently due to being out of town.    She received a flu vaccine last month and is due for a tetanus vaccine, which she declines during today's visit.    Lab tests performed on  showed normal kidney and liver function, excellent cholesterol levels, well-controlled glucose, and satisfactory complete blood count results.    She reports sleep issues and expresses interest in trying magnesium glycinate as suggested by her neurologist. She previously tried Elavil (amitriptyline) but experienced significant next-day drowsiness. She has discontinued melatonin due to bad dreams and prefers to try magnesium before considering prescription sleep medications.    ROS:  General: -fever, -chills, -fatigue, -weight gain, -weight loss  Eyes: -vision changes, -redness, -discharge  ENT: -ear pain, -nasal congestion, -sore throat  Cardiovascular: -chest pain, -palpitations, -lower extremity edema  Respiratory: -cough, -shortness of breath  Gastrointestinal: -abdominal pain, -nausea, -vomiting, -diarrhea,  "-constipation, -blood in stool  Genitourinary: -dysuria, -hematuria, -frequency  Musculoskeletal: -joint pain, -muscle pain  Skin: -rash, -lesion  Neurological: -headache, -dizziness, -numbness, -tingling  Psychiatric: -anxiety, -depression, +sleep difficulty       Objective:   /88 (BP Location: Left arm, Patient Position: Sitting)   Pulse 97   Resp 19   Ht 5' 1" (1.549 m)   Wt 74.1 kg (163 lb 5.8 oz)   LMP 10/27/2024   SpO2 97%   BMI 30.87 kg/m²     Physical Exam  Vitals reviewed.   Constitutional:       General: She is not in acute distress.  HENT:      Head: Normocephalic and atraumatic.      Right Ear: Ear canal and external ear normal.      Left Ear: Ear canal and external ear normal.      Nose: Nose normal.      Mouth/Throat:      Mouth: Mucous membranes are moist.      Pharynx: No oropharyngeal exudate or posterior oropharyngeal erythema.   Eyes:      Extraocular Movements: Extraocular movements intact.      Conjunctiva/sclera: Conjunctivae normal.      Pupils: Pupils are equal, round, and reactive to light.   Cardiovascular:      Rate and Rhythm: Normal rate and regular rhythm.      Pulses: Normal pulses.      Heart sounds: Normal heart sounds.   Pulmonary:      Effort: Pulmonary effort is normal. No respiratory distress.      Breath sounds: No wheezing or rales.   Abdominal:      General: Abdomen is flat. Bowel sounds are normal. There is no distension.      Palpations: Abdomen is soft.      Tenderness: There is no abdominal tenderness. There is no guarding.   Musculoskeletal:      Cervical back: Normal range of motion. No rigidity or tenderness.   Lymphadenopathy:      Cervical: No cervical adenopathy.   Skin:     General: Skin is warm.      Capillary Refill: Capillary refill takes less than 2 seconds.   Neurological:      Mental Status: She is alert and oriented to person, place, and time.      Cranial Nerves: No cranial nerve deficit.      Sensory: No sensory deficit.   Psychiatric:         " Mood and Affect: Mood normal.         Behavior: Behavior normal.        Lab Visit on 10/31/2024   Component Date Value Ref Range Status    Sodium 10/31/2024 142  136 - 145 mmol/L Final    Potassium 10/31/2024 3.9  3.5 - 5.1 mmol/L Final    Chloride 10/31/2024 106  95 - 110 mmol/L Final    CO2 10/31/2024 26  23 - 29 mmol/L Final    Glucose 10/31/2024 82  70 - 110 mg/dL Final    BUN 10/31/2024 11  6 - 20 mg/dL Final    Creatinine 10/31/2024 1.0  0.5 - 1.4 mg/dL Final    Calcium 10/31/2024 9.8  8.7 - 10.5 mg/dL Final    Total Protein 10/31/2024 7.2  6.0 - 8.4 g/dL Final    Albumin 10/31/2024 4.2  3.5 - 5.2 g/dL Final    Total Bilirubin 10/31/2024 0.3  0.1 - 1.0 mg/dL Final    Comment: For infants and newborns, interpretation of results should be based  on gestational age, weight and in agreement with clinical  observations.    Premature Infant recommended reference ranges:  Up to 24 hours.............<8.0 mg/dL  Up to 48 hours............<12.0 mg/dL  3-5 days..................<15.0 mg/dL  6-29 days.................<15.0 mg/dL      Alkaline Phosphatase 10/31/2024 77  40 - 150 U/L Final    AST 10/31/2024 23  10 - 40 U/L Final    ALT 10/31/2024 13  10 - 44 U/L Final    eGFR 10/31/2024 >60  >60 mL/min/1.73 m^2 Final    Anion Gap 10/31/2024 10  8 - 16 mmol/L Final    Cholesterol 10/31/2024 178  120 - 199 mg/dL Final    Comment: The National Cholesterol Education Program (NCEP) has set the  following guidelines (reference ranges) for Cholesterol:  Optimal.....................<200 mg/dL  Borderline High.............200-239 mg/dL  High........................> or = 240 mg/dL      Triglycerides 10/31/2024 65  30 - 150 mg/dL Final    Comment: The National Cholesterol Education Program (NCEP) has set the  following guidelines (reference values) for triglycerides:  Normal......................<150 mg/dL  Borderline High.............150-199 mg/dL  High........................200-499 mg/dL      HDL 10/31/2024 74  40 - 75 mg/dL Final     Comment: The National Cholesterol Education Program (NCEP) has set the  following guidelines (reference values) for HDL Cholesterol:  Low...............<40 mg/dL  Optimal...........>60 mg/dL      LDL Cholesterol 10/31/2024 91.0  63.0 - 159.0 mg/dL Final    Comment: The National Cholesterol Education Program (NCEP) has set the  following guidelines (reference values) for LDL Cholesterol:  Optimal.......................<130 mg/dL  Borderline High...............130-159 mg/dL  High..........................160-189 mg/dL  Very High.....................>190 mg/dL      HDL/Cholesterol Ratio 10/31/2024 41.6  20.0 - 50.0 % Final    Total Cholesterol/HDL Ratio 10/31/2024 2.4  2.0 - 5.0 Final    Non-HDL Cholesterol 10/31/2024 104  mg/dL Final    Comment: Risk category and Non-HDL cholesterol goals:  Coronary heart disease (CHD)or equivalent (10-year risk of CHD >20%):  Non-HDL cholesterol goal     <130 mg/dL  Two or more CHD risk factors and 10-year risk of CHD <= 20%:  Non-HDL cholesterol goal     <160 mg/dL  0 to 1 CHD risk factor:  Non-HDL cholesterol goal     <190 mg/dL      Hemoglobin A1C 10/31/2024 5.2  4.0 - 5.6 % Final    Comment: ADA Screening Guidelines:  5.7-6.4%  Consistent with prediabetes  >or=6.5%  Consistent with diabetes    High levels of fetal hemoglobin interfere with the HbA1C  assay. Heterozygous hemoglobin variants (HbS, HgC, etc)do  not significantly interfere with this assay.   However, presence of multiple variants may affect accuracy.      Estimated Avg Glucose 10/31/2024 103  68 - 131 mg/dL Final    WBC 10/31/2024 7.82  3.90 - 12.70 K/uL Final    RBC 10/31/2024 4.41  4.00 - 5.40 M/uL Final    Hemoglobin 10/31/2024 13.4  12.0 - 16.0 g/dL Final    Hematocrit 10/31/2024 39.9  37.0 - 48.5 % Final    MCV 10/31/2024 91  82 - 98 fL Final    MCH 10/31/2024 30.4  27.0 - 31.0 pg Final    MCHC 10/31/2024 33.6  32.0 - 36.0 g/dL Final    RDW 10/31/2024 11.2 (L)  11.5 - 14.5 % Final    Platelets 10/31/2024 244   150 - 450 K/uL Final    MPV 10/31/2024 10.8  9.2 - 12.9 fL Final     The ASCVD Risk score (Arcelia SCHRADER, et al., 2019) failed to calculate for the following reasons:    The 2019 ASCVD risk score is only valid for ages 40 to 79    Assessment        ICD-10-CM ICD-9-CM   1. General medical exam  Z00.00 V70.9   2. Hypertension, benign  I10 401.1   3. Mixed hyperlipidemia  E78.2 272.2   4. Migraine without aura and without status migrainosus, not intractable  G43.009 346.10   5. Need for vaccination  Z23 V05.9         Plan:   Assessment & Plan    IMPRESSION:  Assessed cholesterol, kidney, liver, and blood sugar from recent labs; all results within normal range  Evaluated need for tetanus vaccine; left order in place for future administration  Determined patient no longer requires infusions based on improved lab results    FOLLOW UP:  Instructed to follow up in 1 year for annual visit.           1. General medical exam  -     Comprehensive Metabolic Panel; Future; Expected date: 11/07/2025  -     CBC Auto Differential; Future; Expected date: 11/07/2025  -     Lipid Panel; Future; Expected date: 11/07/2025  -     Hemoglobin A1C; Future; Expected date: 11/07/2025  Age appropriate screenings, vaccinations, and recommendations reviewed and discussed.  Appropriate orders placed and previous results reviewed.    2. Hypertension, benign  -     labetaloL (NORMODYNE) 100 MG tablet; Take 1 tablet (100 mg total) by mouth 2 (two) times daily.  Dispense: 180 tablet; Refill: 3  -     amLODIPine (NORVASC) 10 MG tablet; Take 1 tablet (10 mg total) by mouth once daily.  Dispense: 90 tablet; Refill: 3  -     Comprehensive Metabolic Panel; Future; Expected date: 11/07/2025  -     CBC Auto Differential; Future; Expected date: 11/07/2025  -     Lipid Panel; Future; Expected date: 11/07/2025  -     Hemoglobin A1C; Future; Expected date: 11/07/2025  Blood pressure is at goal.    Continue current blood pressure regimen of labetalol and  amlodipine.    3. Mixed hyperlipidemia  -     Lipids Digital Medicine (LDMP) Enrollment Order  -     Lipids Digital Medicine (LDMP): Assign Onboarding Questionnaires  -     pravastatin (PRAVACHOL) 20 MG tablet; Take 1 tablet (20 mg total) by mouth once daily.  Dispense: 90 tablet; Refill: 3  -     Comprehensive Metabolic Panel; Future; Expected date: 11/07/2025  -     CBC Auto Differential; Future; Expected date: 11/07/2025  -     Lipid Panel; Future; Expected date: 11/07/2025  -     Hemoglobin A1C; Future; Expected date: 11/07/2025  Continue pravastatin.    4. Migraine without aura and without status migrainosus, not intractable  -     rizatriptan (MAXALT-MLT) 10 MG disintegrating tablet; Take 1 tablet (10 mg total) by mouth as needed for Migraine. May repeat in 2 hours if needed. Max 20mg/24hrs.  Dispense: 12 tablet; Refill: 5  Patient states Maxalt works well for her.    Continue Maxalt as needed.    5. Need for vaccination  -     Tdap (BOOSTRIX) vaccine injection 0.5 mL  Declines having tetanus vaccination done today.    Order placed for patient's do tetanus vaccination at her convenience.           -Rodger Conteh Jr., MD, AAHIVS      This note was generated with the assistance of ambient listening technology. Verbal consent was obtained by the patient and accompanying visitor(s) for the recording of patient appointment to facilitate this note. I attest to having reviewed and edited the generated note for accuracy, though some syntax or spelling errors may persist. Please contact the author of this note for any clarification.      Patient Instructions   Magnesium citrate  This form of magnesium is good for digestive issues, constipation, and muscle soreness. It's absorbed well, especially in liquid form. Magnesium citrate works by causing the intestines to release water into stool, which can help with regularity. However, it can have a laxative effect, which may lead to dehydration and diarrhea if taken in  high doses or frequently.    Magnesium glycinate  This form of magnesium is good for calming the nervous system, anxiety, insomnia, and muscle recovery. It's easily absorbed and has a gentle effect on the digestive system, making it a good choice for people with sensitive stomachs. Research suggests that magnesium glycinate can improve sleep quality, reduce anxiety, and optimize muscle performance.       Follow up in about 1 year (around 11/7/2025) for Annual, HTN, HLD (fasting labs a week prior).   Future Appointments   Date Time Provider Department Center   11/26/2024 10:20 AM Rodger Conteh Jr., MD [facility]

## 2025-02-20 ENCOUNTER — OFFICE VISIT (OUTPATIENT)
Dept: URGENT CARE | Facility: CLINIC | Age: 30
End: 2025-02-20
Payer: COMMERCIAL

## 2025-02-20 VITALS
SYSTOLIC BLOOD PRESSURE: 144 MMHG | WEIGHT: 157.88 LBS | HEIGHT: 61 IN | DIASTOLIC BLOOD PRESSURE: 89 MMHG | OXYGEN SATURATION: 99 % | BODY MASS INDEX: 29.81 KG/M2 | TEMPERATURE: 99 F | HEART RATE: 92 BPM | RESPIRATION RATE: 20 BRPM

## 2025-02-20 DIAGNOSIS — B97.89 ACUTE VIRAL SINUSITIS: Primary | ICD-10-CM

## 2025-02-20 DIAGNOSIS — R05.9 COUGH, UNSPECIFIED TYPE: ICD-10-CM

## 2025-02-20 DIAGNOSIS — J01.90 ACUTE VIRAL SINUSITIS: Primary | ICD-10-CM

## 2025-02-20 DIAGNOSIS — J34.89 SINUS PRESSURE: ICD-10-CM

## 2025-02-20 LAB
CTP QC/QA: YES
CTP QC/QA: YES
POC MOLECULAR INFLUENZA A AGN: NEGATIVE
POC MOLECULAR INFLUENZA B AGN: NEGATIVE
SARS CORONAVIRUS 2 ANTIGEN: NEGATIVE

## 2025-02-20 RX ORDER — CETIRIZINE HYDROCHLORIDE 10 MG/1
10 TABLET ORAL DAILY
Qty: 30 TABLET | Refills: 0 | Status: SHIPPED | OUTPATIENT
Start: 2025-02-20

## 2025-02-20 RX ORDER — PREDNISONE 20 MG/1
20 TABLET ORAL DAILY
Qty: 5 TABLET | Refills: 0 | Status: SHIPPED | OUTPATIENT
Start: 2025-02-20 | End: 2025-02-25

## 2025-02-20 RX ORDER — FLUTICASONE PROPIONATE 50 MCG
1 SPRAY, SUSPENSION (ML) NASAL DAILY
Qty: 11.1 ML | Refills: 0 | Status: SHIPPED | OUTPATIENT
Start: 2025-02-20

## 2025-02-20 NOTE — PROGRESS NOTES
"Subjective:      Patient ID: Bailey Arita is a 29 y.o. female.    Vitals:  height is 5' 1" (1.549 m) and weight is 71.6 kg (157 lb 13.6 oz). Her oral temperature is 99 °F (37.2 °C). Her blood pressure is 144/89 (abnormal) and her pulse is 92. Her respiration is 20 and oxygen saturation is 99%.     Chief Complaint: Cough    30 y/o here for sore throat, sinus pain, pressure, cough, chills started yesterday. 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 She states she also suffers with migraine HA. She took rizatriptan with no relief of her headache. She denies SOB, CP, GI complaints, fever, or any other associated symptoms.     Cough  This is a new problem. The current episode started yesterday. The problem has been unchanged. The problem occurs constantly. The cough is Non-productive. Associated symptoms include chills, headaches, nasal congestion and a sore throat. Pertinent negatives include no chest pain, ear pain, fever, hemoptysis, postnasal drip, rash or shortness of breath. Nothing aggravates the symptoms. She has tried OTC cough suppressant for the symptoms. The treatment provided no relief. There is no history of environmental allergies.       Constitution: Positive for chills. Negative for activity change, appetite change, sweating, fatigue, fever and unexpected weight change.   HENT:  Positive for congestion, sinus pain, sinus pressure and sore throat. Negative for ear pain, ear discharge, foreign body in ear, nosebleeds, foreign body in nose, postnasal drip, trouble swallowing and voice change.    Neck: Negative for neck pain, neck stiffness and painful lymph nodes.   Cardiovascular:  Negative for chest trauma, chest pain and leg swelling.   Eyes:  Negative for eye trauma, foreign body in eye, eye discharge and eye itching.   Respiratory:  Positive for cough. Negative for sleep apnea, chest tightness, sputum production, bloody sputum, COPD, shortness of breath and stridor.    Gastrointestinal:  Negative " for abdominal trauma, abdominal pain, abdominal bloating, history of abdominal surgery and nausea.   Endocrine: hair loss and cold intolerance.   Genitourinary:  Negative for dysuria, frequency, urgency and urine decreased.   Musculoskeletal:  Negative for pain, trauma, joint pain, joint swelling and abnormal ROM of joint.   Skin:  Negative for color change, pale and rash.   Allergic/Immunologic: Negative for environmental allergies, seasonal allergies, food allergies and eczema.   Neurological:  Positive for headaches and history of migraines. Negative for dizziness, history of vertigo, light-headedness, passing out, disorientation and altered mental status.   Hematologic/Lymphatic: Negative for swollen lymph nodes, easy bruising/bleeding, trouble clotting and history of blood clots. Does not bruise/bleed easily.   Psychiatric/Behavioral:  Negative for altered mental status, disorientation, confusion, agitation and nervous/anxious. The patient is not nervous/anxious.       Objective:     Physical Exam   Constitutional: She is oriented to person, place, and time. She appears well-developed. She is cooperative.  Non-toxic appearance. She does not appear ill. No distress.   HENT:   Head: Normocephalic and atraumatic.   Ears:   Right Ear: Hearing, external ear and ear canal normal. A middle ear effusion is present.   Left Ear: Hearing, external ear and ear canal normal. A middle ear effusion is present.   Nose: Congestion present. No mucosal edema, rhinorrhea or nasal deformity. No epistaxis. Right sinus exhibits frontal sinus tenderness. Right sinus exhibits no maxillary sinus tenderness. Left sinus exhibits frontal sinus tenderness. Left sinus exhibits no maxillary sinus tenderness.   Mouth/Throat: Uvula is midline, oropharynx is clear and moist and mucous membranes are normal. Mucous membranes are moist. No trismus in the jaw. Normal dentition. No uvula swelling. No oropharyngeal exudate, posterior oropharyngeal  edema or posterior oropharyngeal erythema. Oropharynx is clear.   Eyes: Conjunctivae and lids are normal. No scleral icterus.   Neck: Trachea normal and phonation normal. Neck supple. No edema present. No erythema present. No neck rigidity present.   Cardiovascular: Normal rate, regular rhythm, normal heart sounds and normal pulses.   Pulmonary/Chest: Effort normal and breath sounds normal. No stridor. No respiratory distress. She has no decreased breath sounds. She has no wheezes. She has no rhonchi. She has no rales. She exhibits no tenderness.   Abdominal: Normal appearance and bowel sounds are normal. Soft.   Musculoskeletal: Normal range of motion.         General: No deformity. Normal range of motion.   Neurological: She is alert and oriented to person, place, and time. She exhibits normal muscle tone. Coordination normal.   Skin: Skin is warm, dry, intact, not diaphoretic and not pale.   Psychiatric: Her speech is normal and behavior is normal. Judgment and thought content normal.   Nursing note and vitals reviewed.    Results for orders placed or performed in visit on 02/20/25   POCT Influenza A/B MOLECULAR    Collection Time: 02/20/25 11:24 AM   Result Value Ref Range    POC Molecular Influenza A Ag Negative Negative    POC Molecular Influenza B Ag Negative Negative     Acceptable Yes    SARS Coronavirus 2 Antigen, POCT Manual Read    Collection Time: 02/20/25 11:34 AM   Result Value Ref Range    SARS Coronavirus 2 Antigen Negative Negative, Presumptive Negative     Acceptable Yes         Assessment:     1. Acute viral sinusitis    2. Cough, unspecified type    3. Sinus pressure        Plan:       Acute viral sinusitis  -     predniSONE (DELTASONE) 20 MG tablet; Take 1 tablet (20 mg total) by mouth once daily. for 5 days  Dispense: 5 tablet; Refill: 0  -     fluticasone propionate (FLONASE) 50 mcg/actuation nasal spray; 1 spray (50 mcg total) by Each Nostril route once daily.   Dispense: 11.1 mL; Refill: 0  -     cetirizine (ZYRTEC) 10 MG tablet; Take 1 tablet (10 mg total) by mouth once daily.  Dispense: 30 tablet; Refill: 0    Cough, unspecified type  -     POCT Influenza A/B MOLECULAR  -     SARS Coronavirus 2 Antigen, POCT Manual Read    Sinus pressure  -     predniSONE (DELTASONE) 20 MG tablet; Take 1 tablet (20 mg total) by mouth once daily. for 5 days  Dispense: 5 tablet; Refill: 0              Additional MDM:     Heart Failure Score:   COPD = No

## 2025-02-20 NOTE — LETTER
February 20, 2025      [facility]  [address], [address]  [place] [address]  Phone: [phone]  Fax: [phone]       Patient: Bailey Arita   YOB: 1995  Date of Visit: 02/20/2025    To Whom It May Concern:    Juan Arita  was at [facility] on 02/20/2025. The patient may return to work on 2/21/25 with no restrictions. If you have any questions or concerns, or if I can be of further assistance, please do not hesitate to contact me.    Sincerely,    Moi Landeros NP

## 2025-02-20 NOTE — PATIENT INSTRUCTIONS
Take prednisone once daily for 5 days.  Use Flonase and Zyrtec daily for your sinus.    When do I need to call the doctor?   You have an upset stomach and throwing up.  You have more pain in your face and head.  You are not getting better within 1 to 2 weeks.  You have new or worsening symptoms.  - Rest.    - Drink plenty of fluids.    - Acetaminophen (tylenol) or Ibuprofen (advil,motrin) as directed as needed for fever/pain. Avoid tylenol if you have a history of liver disease. Do not take ibuprofen if you have a history of GI bleeding, kidney disease, or if you take blood thinners.     - Follow up with your PCP or specialty clinic as directed in the next 1-2 weeks if not improved or as needed.  You can call [phone] to schedule an appointment with the appropriate provider.    - Go to the ER or seek medical attention immediately if you develop new or worsening symptoms.     - You must understand that you have received an Urgent Care treatment only and that you may be released before all of your medical problems are known or treated.   - You, the patient, will arrange for follow up care as instructed.   - If your condition worsens or fails to improve we recommend that you receive another evaluation at the ER immediately or contact your PCP to discuss your concerns or return here.

## 2025-05-15 ENCOUNTER — HOSPITAL ENCOUNTER (EMERGENCY)
Facility: HOSPITAL | Age: 30
Discharge: HOME | End: 2025-05-15
Attending: EMERGENCY MEDICINE
Payer: COMMERCIAL

## 2025-05-15 VITALS
BODY MASS INDEX: 29.27 KG/M2 | WEIGHT: 155 LBS | HEIGHT: 61 IN | RESPIRATION RATE: 18 BRPM | DIASTOLIC BLOOD PRESSURE: 79 MMHG | SYSTOLIC BLOOD PRESSURE: 128 MMHG | HEART RATE: 77 BPM | OXYGEN SATURATION: 100 % | TEMPERATURE: 98 F

## 2025-05-15 DIAGNOSIS — M54.42 ACUTE BILATERAL LOW BACK PAIN WITH BILATERAL SCIATICA: Primary | ICD-10-CM

## 2025-05-15 DIAGNOSIS — M54.41 ACUTE BILATERAL LOW BACK PAIN WITH BILATERAL SCIATICA: Primary | ICD-10-CM

## 2025-05-15 LAB
B-HCG UR QL: NEGATIVE
BILIRUBIN, POC UA: NEGATIVE
BLOOD, POC UA: NEGATIVE
CLARITY, UA: CLEAR
COLOR, UA: YELLOW
CTP QC/QA: YES
GLUCOSE, POC UA: NEGATIVE
KETONES, POC UA: NEGATIVE
LEUKOCYTE EST, POC UA: NEGATIVE
NITRITE, POC UA: NEGATIVE
PH UR STRIP: 6 [PH] (ref 5–8)
PROTEIN, POC UA: NEGATIVE
SPECIFIC GRAVITY, POC UA: 1.02 (ref 1–1.03)
UROBILINOGEN, POC UA: 0.2 E.U./DL

## 2025-05-15 PROCEDURE — 81025 URINE PREGNANCY TEST: CPT | Mod: ER

## 2025-05-15 PROCEDURE — 81025 URINE PREGNANCY TEST: CPT | Mod: ER | Performed by: EMERGENCY MEDICINE

## 2025-05-15 PROCEDURE — 25000003 PHARM REV CODE 250: Mod: ER | Performed by: EMERGENCY MEDICINE

## 2025-05-15 PROCEDURE — 99284 EMERGENCY DEPT VISIT MOD MDM: CPT | Mod: 25,ER

## 2025-05-15 PROCEDURE — 96372 THER/PROPH/DIAG INJ SC/IM: CPT | Performed by: EMERGENCY MEDICINE

## 2025-05-15 PROCEDURE — 63600175 PHARM REV CODE 636 W HCPCS: Mod: ER | Performed by: EMERGENCY MEDICINE

## 2025-05-15 RX ORDER — DICLOFENAC SODIUM 10 MG/G
2 GEL TOPICAL 4 TIMES DAILY PRN
Qty: 200 G | Refills: 0 | Status: SHIPPED | OUTPATIENT
Start: 2025-05-15

## 2025-05-15 RX ORDER — KETOROLAC TROMETHAMINE 30 MG/ML
30 INJECTION, SOLUTION INTRAMUSCULAR; INTRAVENOUS
Status: COMPLETED | OUTPATIENT
Start: 2025-05-15 | End: 2025-05-15

## 2025-05-15 RX ORDER — ACETAMINOPHEN 500 MG
500 TABLET ORAL EVERY 6 HOURS PRN
Qty: 30 TABLET | Refills: 0 | Status: SHIPPED | OUTPATIENT
Start: 2025-05-15

## 2025-05-15 RX ORDER — METHOCARBAMOL 500 MG/1
1000 TABLET, FILM COATED ORAL
Status: COMPLETED | OUTPATIENT
Start: 2025-05-15 | End: 2025-05-15

## 2025-05-15 RX ORDER — IBUPROFEN 600 MG/1
600 TABLET, FILM COATED ORAL EVERY 6 HOURS PRN
Qty: 20 TABLET | Refills: 0 | Status: SHIPPED | OUTPATIENT
Start: 2025-05-15

## 2025-05-15 RX ORDER — DEXAMETHASONE SODIUM PHOSPHATE 4 MG/ML
8 INJECTION, SOLUTION INTRA-ARTICULAR; INTRALESIONAL; INTRAMUSCULAR; INTRAVENOUS; SOFT TISSUE
Status: COMPLETED | OUTPATIENT
Start: 2025-05-15 | End: 2025-05-15

## 2025-05-15 RX ORDER — METHOCARBAMOL 500 MG/1
1000 TABLET, FILM COATED ORAL 3 TIMES DAILY
Qty: 30 TABLET | Refills: 0 | Status: SHIPPED | OUTPATIENT
Start: 2025-05-15 | End: 2025-05-24

## 2025-05-15 RX ADMIN — METHOCARBAMOL 1000 MG: 500 TABLET ORAL at 04:05

## 2025-05-15 RX ADMIN — KETOROLAC TROMETHAMINE 30 MG: 30 INJECTION, SOLUTION INTRAMUSCULAR; INTRAVENOUS at 04:05

## 2025-05-15 RX ADMIN — DEXAMETHASONE SODIUM PHOSPHATE 8 MG: 4 INJECTION INTRA-ARTICULAR; INTRALESIONAL; INTRAMUSCULAR; INTRAVENOUS; SOFT TISSUE at 04:05

## 2025-05-15 NOTE — Clinical Note
"Bailey "Bailey" Juan J was seen and treated in our emergency department on 5/15/2025.  She may return to work on 05/17/2025.       If you have any questions or concerns, please don't hesitate to call.      Brielle Raymond, DO"

## 2025-05-15 NOTE — ED PROVIDER NOTES
Encounter Date: 5/15/2025    SCRIBE #1 NOTE: I, Waldemar Cowan Do, am scribing for, and in the presence of,  Brielle Raymond DO. I have scribed the following portions of the note - Other sections scribed: HPI, ROS, PE, MDM.       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      29 y.o. female with a pertinent PMHx of HTN and HLD, who presents to the ED for chief complaint of bilateral lower back pain onset about 2 days ago. Patient reports numbness/paresthesia and weakness to bilateral lower extremities. 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 She reports a history of back pain, however notes this pain feels different. She states she is able to ambulate with pain. She denies a history of diabetes. Patient reports her LMP was about 2 weeks ago. Patient reports adherence with propanolol, amlodipine, and atorvastatin. No other exacerbating or alleviating factors. Patient denies any urinary/bowel incontinence, saddle anaesthesia, or other associated symptoms. Patient reports social alcohol use, however denies any tobacco or other illicit drug use.     The history is provided by the patient. No  was used.     Review of patient's allergies indicates:  No Known Allergies  Past Medical History:   Diagnosis Date    Hypertension, benign 2/17/2023    Mixed hyperlipidemia 2/17/2023    Lab Results Component Value Date  CHOL 279 (H) 04/20/2023  CHOL 276 (H) 10/06/2022  CHOL 272 (H) 04/04/2022  Lab Results Component Value Date  HDL 95 (H) 04/20/2023  HDL 89 (H) 10/06/2022  HDL 85 (H) 04/04/2022  Lab Results Component Value Date  LDLCALC 170.2 (H) 04/20/2023  LDLCALC 163.2 (H) 10/06/2022  LDLCALC 168.8 (H) 04/04/2022  Lab Results  Component Value Date  TRIG 69 04/20/2023  TRIG 119 1     Past Surgical History:   Procedure Laterality Date    APPENDECTOMY      BREAST SURGERY      KIDNEY SURGERY      Blocked tube     Family History   Problem Relation Name Age of Onset    Breast cancer Neg Hx      Colon cancer Neg Hx      Ovarian cancer Neg Hx       Social History[1]  Review of Systems   Constitutional:  Negative for fever.   HENT:  Negative for rhinorrhea and sore throat.    Eyes:  Negative for redness.   Respiratory:  Negative for shortness of breath.    Cardiovascular:  Negative for chest pain and leg swelling.   Gastrointestinal:  Negative for abdominal pain, diarrhea, nausea and vomiting.        (-) Bowel incontinence   Genitourinary:  Negative for enuresis.   Musculoskeletal:  Positive for back pain.   Skin:  Negative for rash.   Neurological:  Positive for weakness and numbness. Negative for syncope and headaches.        (-) Saddle anaesthesia   All other systems reviewed and are negative.      Physical Exam     Initial Vitals [05/15/25 1403]   BP Pulse Resp Temp SpO2   (!) 143/102 89 18 98.1 °F (36.7 °C) 100 %      MAP       --         Patient gave consent to have physical exam performed.    Physical Exam    Constitutional: Vital signs are normal. She appears well-developed and well-nourished.   HENT:   Head: Normocephalic and atraumatic.   Right Ear: External ear normal.   Left Ear: External ear normal.   Nose: Nose normal. Mouth/Throat: Oropharynx is clear and moist.   Eyes: Conjunctivae are normal.   Neck: Neck supple.   Normal range of motion.  Cardiovascular:  Normal rate, regular rhythm and normal heart sounds.     Exam reveals no gallop and no friction rub.       No murmur heard.  Pulmonary/Chest: Breath sounds normal. No respiratory distress.   Musculoskeletal:      Cervical back: Normal range of motion and neck supple.      Comments: Bilateral SI joint tenderness without midline bony tenderness, step-off, crepitus, or gross  deformity. Sensations are grossly intact. 5/5 muscle strength to bilateral lower extremities.      Neurological: She is alert and oriented to person, place, and time.   Skin: Skin is warm and dry. Capillary refill takes less than 2 seconds. No rash noted.   Psychiatric: She has a normal mood and affect.         ED Course   Procedures  Labs Reviewed   POCT URINE PREGNANCY       Result Value    POC Preg Test, Ur Negative       Acceptable Yes     POCT URINALYSIS W/O SCOPE    Glucose, UA Negative      Bilirubin, UA Negative      Ketones, UA Negative      Spec Grav UA 1.025      Blood, UA Negative      PH, UA 6.0      Protein, UA Negative      Urobilinogen, UA 0.2      Nitrite, UA Negative      Leukocytes, UA Negative      Color, UA POC Yellow      Clarity, UA, POC Clear     POCT URINALYSIS W/O SCOPE          Imaging Results    None          Medications   ketorolac injection 30 mg (30 mg Intramuscular Given 5/15/25 1612)   dexAMETHasone injection 8 mg (8 mg Intramuscular Given 5/15/25 1611)   methocarbamoL tablet 1,000 mg (1,000 mg Oral Given 5/15/25 1611)     Medical Decision Making  Amount and/or Complexity of Data Reviewed  Labs: ordered. Decision-making details documented in ED Course.    Risk  OTC drugs.  Prescription drug management.    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      Historian: Patient    Refer to Physical exam as documented above.     Vital Signs: Reviewed and reassuring.  Nursing Notes: Reviewed.    Differential Diagnosis Considered:  Pregnancy, lower back pain, muscle strain, muscle spasm, sciatica      Emergency Department Course  Treatment Administered: Physical  examination and medications administered:   Medications   ketorolac injection 30 mg (30 mg Intramuscular Given 5/15/25 1612)   dexAMETHasone injection 8 mg (8 mg Intramuscular Given 5/15/25 1611)   methocarbamoL tablet 1,000 mg (1,000 mg Oral Given 5/15/25 1611)     Response to Treatment: Patient tolerated PO; reports improvement post-treatment.  External Data Reviewed:  Prior medical records.  Vital signs (integrated into HPI and Physical Exam).  Laboratory Studies: Ordered and reviewed.  Pregnancy test negative    Risk Assessment  Social determinants of health impacting care: Body mass index is 29.29 kg/m².     Shared Decision-Making  Discussed the following with the patient:  Treatment plan  Medication instructions    Recommended Outpatient follow up after emergency department visit      All questions answered. Patient actively participated in care decisions.     Discharge Instructions  Prescriptions:   ED Prescriptions       Medication Sig Dispense Start Date End Date Auth. Provider    methocarbamoL (ROBAXIN) 500 MG Tab Take 2 tablets (1,000 mg total) by mouth 3 (three) times daily. for 5 days 30 tablet 5/15/2025 5/20/2025 Brielle Raymond,     acetaminophen (TYLENOL) 500 MG tablet Take 1 tablet (500 mg total) by mouth every 6 (six) hours as needed for Pain (As needed for mild-to-moderate pain). 30 tablet 5/15/2025 -- Brielle Raymond DO    diclofenac sodium (VOLTAREN) 1 % Gel Apply 2 g topically 4 (four) times daily as needed (Apply to painful area 4 times a day as needed for pain). 200 g 5/15/2025 -- Brielle Raymond,     ibuprofen (ADVIL,MOTRIN) 600 MG tablet Take 1 tablet (600 mg total) by mouth every 6 (six) hours as needed for Pain (Take with food as needed for mild-to-moderate pain). 20 tablet 5/15/2025 -- Brielle Rayomnd DO          Instructions:  Fill and take medications as directed.  Return to ED if symptoms worsen or fail to improve.  Follow-Up: PCP or specialist follow-up within 1 day.  Patient Status at  Discharge:  Patient reports improvement in symptoms.     Patient Condition at Discharge  Awake, alert, oriented x4.  Speaking clearly in full sentences.  Moving all four extremities spontaneously.           I, Dr. Brielle Raymond, personally performed the services described in this documentation. This document was produced by a scribe under my direction and in my presence. All medical record entries made by the scribe were at my direction and in my presence.  I have reviewed the chart and agree that the record reflects my personal performance and is accurate and complete. Brielle Raymond DO.     05/15/2025 3:38 PM  DISCLAIMER: This note was prepared with [employer] voice recognition transcription software. Garbled syntax, mangled pronouns, and other bizarre constructions may be attributed to that software system.           Scribe Attestation:   Scribe #1: I performed the above scribed service and the documentation accurately describes the services I performed. I attest to the accuracy of the note.                               Clinical Impression:  Final diagnoses:  [M54.42, M54.41] Acute bilateral low back pain with bilateral sciatica (Primary)          ED Disposition Condition    Discharge Stable          ED Prescriptions       Medication Sig Dispense Start Date End Date Auth. Provider    methocarbamoL (ROBAXIN) 500 MG Tab Take 2 tablets (1,000 mg total) by mouth 3 (three) times daily. for 5 days 30 tablet 5/15/2025 5/20/2025 Brielle Raymond,     acetaminophen (TYLENOL) 500 MG tablet Take 1 tablet (500 mg total) by mouth every 6 (six) hours as needed for Pain (As needed for mild-to-moderate pain). 30 tablet 5/15/2025 -- Brielle Raymond DO    diclofenac sodium (VOLTAREN) 1 % Gel Apply 2 g topically 4 (four) times daily as needed (Apply to painful area 4 times a day as needed for pain). 200 g 5/15/2025 -- Brielle Raymond DO    ibuprofen (ADVIL,MOTRIN) 600 MG tablet Take 1 tablet (600 mg total) by mouth every 6 (six)  hours as needed for Pain (Take with food as needed for mild-to-moderate pain). 20 tablet 5/15/2025 -- Brielle Raymond DO          Follow-up Information       Follow up With Specialties Details Why Contact Info    Val Cabrera MD Orthopedic Surgery Schedule an appointment as soon as possible for a visit in 1 day Follow-up further evaluation of your back pain [address] [facility] [id number]  [phone]      Rodger Conteh Jr., MD Family Medicine  Follow-up further evaluation of your back pain [address] [facility] [address]  [phone]      [facility] [place] - Emergency Dept Emergency Medicine Go to  Please go to [facility] emergency department if symptoms worsen [address] [facility] [place] [id number] [phone]                   [1]   Social History  Tobacco Use    Smoking status: Never    Smokeless tobacco: Never   Substance Use Topics    Alcohol use: No    Drug use: No        Brielle Raymond DO  05/15/25 [id number]